# Patient Record
Sex: FEMALE | Race: WHITE | Employment: PART TIME | URBAN - METROPOLITAN AREA
[De-identification: names, ages, dates, MRNs, and addresses within clinical notes are randomized per-mention and may not be internally consistent; named-entity substitution may affect disease eponyms.]

---

## 2017-08-02 ENCOUNTER — APPOINTMENT (OUTPATIENT)
Dept: GENERAL RADIOLOGY | Age: 37
End: 2017-08-02
Payer: MEDICARE

## 2017-08-02 ENCOUNTER — HOSPITAL ENCOUNTER (EMERGENCY)
Age: 37
Discharge: HOME | End: 2017-08-02
Attending: EMERGENCY MEDICINE
Payer: MEDICARE

## 2017-08-02 VITALS
HEIGHT: 62 IN | SYSTOLIC BLOOD PRESSURE: 126 MMHG | HEART RATE: 93 BPM | DIASTOLIC BLOOD PRESSURE: 70 MMHG | BODY MASS INDEX: 20.24 KG/M2 | RESPIRATION RATE: 16 BRPM | OXYGEN SATURATION: 97 % | TEMPERATURE: 97.9 F | WEIGHT: 110 LBS

## 2017-08-02 DIAGNOSIS — S90.32XA CONTUSION OF LEFT FOOT, INITIAL ENCOUNTER: Primary | ICD-10-CM

## 2017-08-02 PROCEDURE — 73630 X-RAY EXAM OF FOOT: CPT

## 2017-08-02 PROCEDURE — 99283 EMERGENCY DEPT VISIT LOW MDM: CPT

## 2017-08-02 ASSESSMENT — ENCOUNTER SYMPTOMS
EYE REDNESS: 0
BACK PAIN: 0
COUGH: 0
VOMITING: 0
NAUSEA: 0
ABDOMINAL PAIN: 0
EYE DISCHARGE: 0
SORE THROAT: 0
SHORTNESS OF BREATH: 0

## 2017-08-02 ASSESSMENT — PAIN DESCRIPTION - LOCATION: LOCATION: FOOT

## 2017-08-02 ASSESSMENT — PAIN DESCRIPTION - PAIN TYPE: TYPE: ACUTE PAIN

## 2017-08-02 ASSESSMENT — PAIN DESCRIPTION - ORIENTATION: ORIENTATION: LEFT

## 2017-08-02 ASSESSMENT — PAIN SCALES - GENERAL: PAINLEVEL_OUTOF10: 7

## 2017-08-29 ENCOUNTER — OFFICE VISIT (OUTPATIENT)
Dept: PODIATRY | Age: 37
End: 2017-08-29
Payer: MEDICARE

## 2017-08-29 VITALS
BODY MASS INDEX: 20.8 KG/M2 | SYSTOLIC BLOOD PRESSURE: 105 MMHG | DIASTOLIC BLOOD PRESSURE: 61 MMHG | HEIGHT: 62 IN | WEIGHT: 113 LBS | HEART RATE: 68 BPM

## 2017-08-29 DIAGNOSIS — M79.672 LEFT FOOT PAIN: ICD-10-CM

## 2017-08-29 DIAGNOSIS — S97.82XA CRUSH INJURY OF FOOT, LEFT, INITIAL ENCOUNTER: ICD-10-CM

## 2017-08-29 DIAGNOSIS — S90.32XA CONTUSION OF LEFT FOOT, INITIAL ENCOUNTER: Primary | ICD-10-CM

## 2017-08-29 DIAGNOSIS — R60.0 EDEMA OF LEFT FOOT: ICD-10-CM

## 2017-08-29 PROCEDURE — 99203 OFFICE O/P NEW LOW 30 MIN: CPT | Performed by: PODIATRIST

## 2017-08-29 PROCEDURE — L4360 PNEUMAT WALKING BOOT PRE CST: HCPCS | Performed by: PODIATRIST

## 2017-08-29 PROCEDURE — 73630 X-RAY EXAM OF FOOT: CPT | Performed by: PODIATRIST

## 2017-08-29 ASSESSMENT — ENCOUNTER SYMPTOMS
SHORTNESS OF BREATH: 0
NAUSEA: 0
BACK PAIN: 0
COLOR CHANGE: 0
DIARRHEA: 0

## 2017-09-26 ENCOUNTER — OFFICE VISIT (OUTPATIENT)
Dept: PODIATRY | Age: 37
End: 2017-09-26
Payer: MEDICARE

## 2017-09-26 VITALS
DIASTOLIC BLOOD PRESSURE: 57 MMHG | SYSTOLIC BLOOD PRESSURE: 102 MMHG | BODY MASS INDEX: 20.8 KG/M2 | HEART RATE: 85 BPM | WEIGHT: 113 LBS | HEIGHT: 62 IN

## 2017-09-26 DIAGNOSIS — S90.32XD CONTUSION OF LEFT FOOT, SUBSEQUENT ENCOUNTER: Primary | ICD-10-CM

## 2017-09-26 DIAGNOSIS — M79.672 LEFT FOOT PAIN: ICD-10-CM

## 2017-09-26 DIAGNOSIS — S97.82XD CRUSH INJURY OF FOOT, LEFT, SUBSEQUENT ENCOUNTER: ICD-10-CM

## 2017-09-26 DIAGNOSIS — S93.601A SPRAIN OF FOOT, RIGHT, INITIAL ENCOUNTER: ICD-10-CM

## 2017-09-26 DIAGNOSIS — M79.671 RIGHT FOOT PAIN: ICD-10-CM

## 2017-09-26 DIAGNOSIS — R60.0 EDEMA OF LEFT FOOT: ICD-10-CM

## 2017-09-26 DIAGNOSIS — R60.0 EDEMA OF RIGHT FOOT: ICD-10-CM

## 2017-09-26 PROCEDURE — 73630 X-RAY EXAM OF FOOT: CPT | Performed by: PODIATRIST

## 2017-09-26 PROCEDURE — 99213 OFFICE O/P EST LOW 20 MIN: CPT | Performed by: PODIATRIST

## 2017-09-26 ASSESSMENT — ENCOUNTER SYMPTOMS
DIARRHEA: 0
BACK PAIN: 0
COLOR CHANGE: 0
SHORTNESS OF BREATH: 0
NAUSEA: 0

## 2018-04-20 ENCOUNTER — HOSPITAL ENCOUNTER (EMERGENCY)
Age: 38
Discharge: HOME | End: 2018-04-20
Attending: EMERGENCY MEDICINE
Payer: MEDICARE

## 2018-04-20 VITALS
RESPIRATION RATE: 16 BRPM | BODY MASS INDEX: 20.24 KG/M2 | WEIGHT: 110 LBS | OXYGEN SATURATION: 100 % | DIASTOLIC BLOOD PRESSURE: 98 MMHG | HEART RATE: 98 BPM | TEMPERATURE: 98.1 F | SYSTOLIC BLOOD PRESSURE: 144 MMHG | HEIGHT: 62 IN

## 2018-04-20 DIAGNOSIS — N76.4 LABIAL ABSCESS: Primary | ICD-10-CM

## 2018-04-20 PROCEDURE — 99282 EMERGENCY DEPT VISIT SF MDM: CPT

## 2018-04-20 PROCEDURE — 10060 I&D ABSCESS SIMPLE/SINGLE: CPT

## 2018-04-20 PROCEDURE — 2500000003 HC RX 250 WO HCPCS: Performed by: PHYSICIAN ASSISTANT

## 2018-04-20 RX ORDER — LIDOCAINE HYDROCHLORIDE 10 MG/ML
5 INJECTION, SOLUTION EPIDURAL; INFILTRATION; INTRACAUDAL; PERINEURAL ONCE
Status: COMPLETED | OUTPATIENT
Start: 2018-04-20 | End: 2018-04-20

## 2018-04-20 RX ORDER — DOXYCYCLINE HYCLATE 100 MG
100 TABLET ORAL 2 TIMES DAILY
Qty: 20 TABLET | Refills: 0 | Status: SHIPPED | OUTPATIENT
Start: 2018-04-20 | End: 2018-04-30

## 2018-04-20 RX ADMIN — LIDOCAINE HYDROCHLORIDE 5 ML: 10 INJECTION, SOLUTION EPIDURAL; INFILTRATION; INTRACAUDAL; PERINEURAL at 17:49

## 2018-04-20 ASSESSMENT — ENCOUNTER SYMPTOMS
COUGH: 0
EYE REDNESS: 0
VOMITING: 0
ABDOMINAL PAIN: 0
SORE THROAT: 0
NAUSEA: 0
BACK PAIN: 0
DIARRHEA: 0
EYE DISCHARGE: 0
SHORTNESS OF BREATH: 0

## 2018-04-20 ASSESSMENT — PAIN DESCRIPTION - ORIENTATION: ORIENTATION: RIGHT

## 2018-04-20 ASSESSMENT — PAIN SCALES - GENERAL: PAINLEVEL_OUTOF10: 7

## 2018-04-20 ASSESSMENT — PAIN DESCRIPTION - LOCATION: LOCATION: VAGINA

## 2018-04-20 ASSESSMENT — PAIN DESCRIPTION - PAIN TYPE: TYPE: ACUTE PAIN

## 2018-08-19 ENCOUNTER — HOSPITAL ENCOUNTER (EMERGENCY)
Age: 38
Discharge: HOME | End: 2018-08-19
Attending: EMERGENCY MEDICINE
Payer: MEDICARE

## 2018-08-19 VITALS
WEIGHT: 115 LBS | RESPIRATION RATE: 18 BRPM | DIASTOLIC BLOOD PRESSURE: 97 MMHG | BODY MASS INDEX: 21.16 KG/M2 | HEART RATE: 95 BPM | HEIGHT: 62 IN | OXYGEN SATURATION: 100 % | TEMPERATURE: 97.5 F | SYSTOLIC BLOOD PRESSURE: 158 MMHG

## 2018-08-19 DIAGNOSIS — N75.0 BARTHOLIN CYST: Primary | ICD-10-CM

## 2018-08-19 PROCEDURE — 6370000000 HC RX 637 (ALT 250 FOR IP): Performed by: EMERGENCY MEDICINE

## 2018-08-19 PROCEDURE — 99282 EMERGENCY DEPT VISIT SF MDM: CPT

## 2018-08-19 RX ORDER — CLINDAMYCIN HYDROCHLORIDE 300 MG/1
300 CAPSULE ORAL 3 TIMES DAILY
Qty: 21 CAPSULE | Refills: 0 | Status: SHIPPED | OUTPATIENT
Start: 2018-08-19 | End: 2018-08-26

## 2018-08-19 RX ORDER — CLINDAMYCIN HYDROCHLORIDE 150 MG/1
300 CAPSULE ORAL ONCE
Status: COMPLETED | OUTPATIENT
Start: 2018-08-19 | End: 2018-08-19

## 2018-08-19 RX ADMIN — CLINDAMYCIN HYDROCHLORIDE 300 MG: 150 CAPSULE ORAL at 21:20

## 2018-08-19 ASSESSMENT — PAIN SCALES - GENERAL
PAINLEVEL_OUTOF10: 5
PAINLEVEL_OUTOF10: 5

## 2018-08-19 ASSESSMENT — ENCOUNTER SYMPTOMS
BACK PAIN: 0
SORE THROAT: 0
ABDOMINAL PAIN: 0
EYE PAIN: 0
VOMITING: 0
NAUSEA: 0
SHORTNESS OF BREATH: 0
COUGH: 0
RHINORRHEA: 0
DIARRHEA: 0

## 2018-08-19 ASSESSMENT — PAIN DESCRIPTION - LOCATION
LOCATION: OTHER (COMMENT)
LOCATION: OTHER (COMMENT)

## 2018-08-20 NOTE — ED PROVIDER NOTES
[facility] ED  [address] [address]  Phone: [phone]  Fax: [phone]    eMERGENCY dEPARTMENT eNCOUnter          Pt Name: Nanci Sesay  MRN: [id number]  [place] 1980  Date of evaluation: 2018      CHIEF COMPLAINT       Chief Complaint   Patient presents with    Abscess     noticed it saturday am. patient reports bartholin cyst and reports pain and swelling to the area. HISTORY OF PRESENT ILLNESS              Nanci Sesay is a 41 y.o. female who presents With concern about her returning bartholin gland Cyst.  Has had this once before and was drained. States it was much worse the first time. Denies any fevers. States she noticed it Saturday starting to come back. Nothing otherwise negative better or worse. Denies any abnormal vaginal bleeding or discharge. No abdominal pain. Denies other symptoms or concerns. Denies any other  symptoms such as dysuria. REVIEW OF SYSTEMS         Review of Systems   Constitutional: Negative for chills, fatigue and fever. HENT: Negative for rhinorrhea and sore throat. Eyes: Negative for pain. Respiratory: Negative for cough and shortness of breath. Cardiovascular: Negative for chest pain. Gastrointestinal: Negative for abdominal pain, diarrhea, nausea and vomiting. Genitourinary: Negative for difficulty urinating, dysuria, vaginal bleeding and vaginal discharge. Musculoskeletal: Negative for back pain and neck pain. Skin: Negative for rash. Neurological: Negative for weakness and headaches. All other systems reviewed and are negative. PAST MEDICAL HISTORY    has a past medical history of Anxiety and Anxiety. SURGICAL HISTORY      has a past surgical history that includes  section and Tubal ligation.     CURRENT MEDICATIONS       Discharge Medication List as of 2018  9:18 PM      CONTINUE these medications which have NOT CHANGED    Details   diazepam (VALIUM) 5 1. Bartholin cyst          DISPOSITION/PLAN   DISPOSITION Decision To Discharge 08/19/2018 09:24:41 PM      Condition on Disposition    Improved    PATIENT REFERRED TO:  Caden Sears  [address], [address]  [facility] [address] [phone]    Schedule an appointment as soon as possible for a visit in 1 week        DISCHARGE MEDICATIONS:  Discharge Medication List as of 8/19/2018  9:18 PM      START taking these medications    Details   clindamycin (CLEOCIN) 300 MG capsule Take 1 capsule by mouth 3 times daily for 7 days, Disp-21 capsule, R-0Print             (Please note that portions of this note were completed with a voice recognition program.  Efforts were made to edit the dictations but occasionally words are mis-transcribed.)    Tej Chapa DO  Attending Emergency Physician           Tej Chapa DO  08/19/18 5043

## 2019-10-04 ENCOUNTER — HOSPITAL ENCOUNTER (EMERGENCY)
Age: 39
Discharge: HOME | End: 2019-10-04
Attending: EMERGENCY MEDICINE
Payer: MEDICARE

## 2019-10-04 VITALS
HEART RATE: 90 BPM | BODY MASS INDEX: 21.03 KG/M2 | OXYGEN SATURATION: 100 % | DIASTOLIC BLOOD PRESSURE: 90 MMHG | RESPIRATION RATE: 15 BRPM | TEMPERATURE: 98.1 F | WEIGHT: 115 LBS | SYSTOLIC BLOOD PRESSURE: 142 MMHG

## 2019-10-04 DIAGNOSIS — N75.0 BARTHOLIN GLAND CYST: Primary | ICD-10-CM

## 2019-10-04 PROCEDURE — 6370000000 HC RX 637 (ALT 250 FOR IP): Performed by: PHYSICIAN ASSISTANT

## 2019-10-04 PROCEDURE — 99283 EMERGENCY DEPT VISIT LOW MDM: CPT

## 2019-10-04 RX ORDER — CHOLECALCIFEROL (VITAMIN D3) 1250 MCG
CAPSULE ORAL
COMMUNITY

## 2019-10-04 RX ORDER — DOXYCYCLINE HYCLATE 100 MG
100 TABLET ORAL 2 TIMES DAILY
Qty: 19 TABLET | Refills: 0 | Status: SHIPPED | OUTPATIENT
Start: 2019-10-04 | End: 2019-10-14

## 2019-10-04 RX ORDER — DOXYCYCLINE HYCLATE 100 MG
100 TABLET ORAL ONCE
Status: COMPLETED | OUTPATIENT
Start: 2019-10-04 | End: 2019-10-04

## 2019-10-04 RX ADMIN — DOXYCYCLINE HYCLATE 100 MG: 100 TABLET, COATED ORAL at 18:32

## 2019-10-04 ASSESSMENT — PAIN DESCRIPTION - PAIN TYPE: TYPE: ACUTE PAIN

## 2019-10-04 ASSESSMENT — ENCOUNTER SYMPTOMS
EYE REDNESS: 0
COUGH: 0
BACK PAIN: 0
SHORTNESS OF BREATH: 0
ABDOMINAL PAIN: 0
NAUSEA: 0
VOMITING: 0
EYE DISCHARGE: 0
SORE THROAT: 0

## 2019-10-04 ASSESSMENT — PAIN SCALES - GENERAL: PAINLEVEL_OUTOF10: 5

## 2019-10-04 ASSESSMENT — PAIN DESCRIPTION - LOCATION: LOCATION: VAGINA

## 2020-05-28 ENCOUNTER — APPOINTMENT (OUTPATIENT)
Dept: GENERAL RADIOLOGY | Age: 40
End: 2020-05-28
Payer: MEDICARE

## 2020-05-28 ENCOUNTER — HOSPITAL ENCOUNTER (EMERGENCY)
Age: 40
Discharge: HOME | End: 2020-05-28
Attending: EMERGENCY MEDICINE
Payer: MEDICARE

## 2020-05-28 VITALS
TEMPERATURE: 99 F | DIASTOLIC BLOOD PRESSURE: 79 MMHG | SYSTOLIC BLOOD PRESSURE: 130 MMHG | OXYGEN SATURATION: 100 % | HEART RATE: 94 BPM | RESPIRATION RATE: 12 BRPM

## 2020-05-28 PROCEDURE — 6370000000 HC RX 637 (ALT 250 FOR IP): Performed by: EMERGENCY MEDICINE

## 2020-05-28 PROCEDURE — 99283 EMERGENCY DEPT VISIT LOW MDM: CPT

## 2020-05-28 PROCEDURE — 73552 X-RAY EXAM OF FEMUR 2/>: CPT

## 2020-05-28 RX ORDER — HYDROCODONE BITARTRATE AND ACETAMINOPHEN 5; 325 MG/1; MG/1
1 TABLET ORAL ONCE
Status: DISCONTINUED | OUTPATIENT
Start: 2020-05-28 | End: 2020-05-28

## 2020-05-28 RX ORDER — CYCLOBENZAPRINE HCL 5 MG
5 TABLET ORAL 3 TIMES DAILY PRN
Qty: 20 TABLET | Refills: 0 | Status: SHIPPED | OUTPATIENT
Start: 2020-05-28 | End: 2020-07-03

## 2020-05-28 RX ORDER — HYDROCODONE BITARTRATE AND ACETAMINOPHEN 5; 325 MG/1; MG/1
1 TABLET ORAL EVERY 6 HOURS PRN
Qty: 10 TABLET | Refills: 0 | Status: SHIPPED | OUTPATIENT
Start: 2020-05-28 | End: 2020-05-31

## 2020-05-28 RX ORDER — IBUPROFEN 600 MG/1
600 TABLET ORAL EVERY 6 HOURS PRN
Qty: 30 TABLET | Refills: 0 | Status: SHIPPED | OUTPATIENT
Start: 2020-05-28 | End: 2020-07-03

## 2020-05-28 RX ORDER — IBUPROFEN 600 MG/1
600 TABLET ORAL ONCE
Status: COMPLETED | OUTPATIENT
Start: 2020-05-28 | End: 2020-05-28

## 2020-05-28 RX ADMIN — IBUPROFEN 600 MG: 600 TABLET, FILM COATED ORAL at 17:52

## 2020-05-28 ASSESSMENT — PAIN DESCRIPTION - ONSET: ONSET: PROGRESSIVE

## 2020-05-28 ASSESSMENT — PAIN DESCRIPTION - DESCRIPTORS: DESCRIPTORS: ACHING;DULL;SHARP;THROBBING

## 2020-05-28 ASSESSMENT — PAIN DESCRIPTION - PROGRESSION: CLINICAL_PROGRESSION: GRADUALLY WORSENING

## 2020-05-28 ASSESSMENT — PAIN DESCRIPTION - PAIN TYPE
TYPE: ACUTE PAIN
TYPE: ACUTE PAIN

## 2020-05-28 ASSESSMENT — PAIN DESCRIPTION - LOCATION: LOCATION: LEG

## 2020-05-28 ASSESSMENT — PAIN SCALES - GENERAL
PAINLEVEL_OUTOF10: 6
PAINLEVEL_OUTOF10: 4
PAINLEVEL_OUTOF10: 6

## 2020-05-28 ASSESSMENT — PAIN DESCRIPTION - FREQUENCY: FREQUENCY: CONTINUOUS

## 2020-05-28 NOTE — ED PROVIDER NOTES
[id number] [facility]  [address] [facility] [address]  Phone: [phone]  Fax: [phone]    Pt Name: Huel Favre  MRN: [id number]  [place] 1980  Date of evaluation: 2020      CHIEF COMPLAINT       Chief Complaint   Patient presents with    Leg Pain     Onset two weeks ago when working out. Not seen at PMD          HISTORY OF PRESENT ILLNESS     Huel Favre is a 47 y.o. female who presents fell 2 weeks ago when she was exercising and  Injured her right posterior thigh. No deformity. She has fairly good range of motion. There is no hip or knee injury. Rest of the right leg exam is uninjured. States her pain is a 6 out of a 10 and she requests pain medication at this time. She took Tylenol without relief of her pain. REVIEW OF SYSTEMS       Other ROS negative except as noted above. PAST MEDICAL HISTORY    has a past medical history of Anxiety and Anxiety. SURGICAL HISTORY      has a past surgical history that includes  section and Tubal ligation. CURRENT MEDICATIONS       Discharge Medication List as of 2020  7:08 PM      CONTINUE these medications which have NOT CHANGED    Details   Cholecalciferol (VITAMIN D3) 44143 units CAPS Take by mouthHistorical Med      diazepam (VALIUM) 5 MG tablet Take 5 mg by mouth every 6 hours as needed for Anxiety      busPIRone (BUSPAR) 15 MG tablet Take 30 mg by mouth 2 times daily             ALLERGIES     has No Known Allergies. FAMILY HISTORY     has no family status information on file. family history is not on file. SOCIAL HISTORY      reports that she has been smoking cigarettes. She has been smoking about 0.50 packs per day. She has never used smokeless tobacco. She reports current alcohol use. She reports that she does not use drugs. PHYSICAL EXAM     INITIAL VITALS:  temperature is 99 °F (37.2 °C). Her blood pressure is 130/79 and her pulse is 94.  Her respiration is 12 and oxygen saturation

## 2020-07-03 ENCOUNTER — HOSPITAL ENCOUNTER (EMERGENCY)
Age: 40
Discharge: HOME | End: 2020-07-03
Attending: EMERGENCY MEDICINE
Payer: MEDICARE

## 2020-07-03 VITALS
TEMPERATURE: 98.8 F | SYSTOLIC BLOOD PRESSURE: 114 MMHG | HEART RATE: 97 BPM | BODY MASS INDEX: 20.38 KG/M2 | OXYGEN SATURATION: 100 % | DIASTOLIC BLOOD PRESSURE: 68 MMHG | HEIGHT: 63 IN | RESPIRATION RATE: 12 BRPM | WEIGHT: 115 LBS

## 2020-07-03 PROCEDURE — 99283 EMERGENCY DEPT VISIT LOW MDM: CPT

## 2020-07-03 PROCEDURE — 6360000002 HC RX W HCPCS: Performed by: EMERGENCY MEDICINE

## 2020-07-03 PROCEDURE — 96372 THER/PROPH/DIAG INJ SC/IM: CPT

## 2020-07-03 RX ORDER — BUTALBITAL, ACETAMINOPHEN, CAFFEINE AND CODEINE PHOSPHATE 300; 50; 40; 30 MG/1; MG/1; MG/1; MG/1
1 CAPSULE ORAL EVERY 4 HOURS
COMMUNITY
Start: 2018-11-14

## 2020-07-03 RX ORDER — KETOROLAC TROMETHAMINE 30 MG/ML
30 INJECTION, SOLUTION INTRAMUSCULAR; INTRAVENOUS ONCE
Status: COMPLETED | OUTPATIENT
Start: 2020-07-03 | End: 2020-07-03

## 2020-07-03 RX ORDER — DEXAMETHASONE SODIUM PHOSPHATE 4 MG/ML
4 INJECTION, SOLUTION INTRA-ARTICULAR; INTRALESIONAL; INTRAMUSCULAR; INTRAVENOUS; SOFT TISSUE ONCE
Status: COMPLETED | OUTPATIENT
Start: 2020-07-03 | End: 2020-07-03

## 2020-07-03 RX ORDER — ONDANSETRON 2 MG/ML
4 INJECTION INTRAMUSCULAR; INTRAVENOUS ONCE
Status: COMPLETED | OUTPATIENT
Start: 2020-07-03 | End: 2020-07-03

## 2020-07-03 RX ADMIN — ONDANSETRON 4 MG: 2 INJECTION INTRAMUSCULAR; INTRAVENOUS at 14:35

## 2020-07-03 RX ADMIN — KETOROLAC TROMETHAMINE 30 MG: 30 INJECTION, SOLUTION INTRAMUSCULAR; INTRAVENOUS at 14:35

## 2020-07-03 RX ADMIN — DEXAMETHASONE SODIUM PHOSPHATE 4 MG: 4 INJECTION, SOLUTION INTRAMUSCULAR; INTRAVENOUS at 14:34

## 2020-07-03 ASSESSMENT — PAIN SCALES - GENERAL
PAINLEVEL_OUTOF10: 6
PAINLEVEL_OUTOF10: 3

## 2020-07-03 ASSESSMENT — PAIN DESCRIPTION - PAIN TYPE: TYPE: ACUTE PAIN

## 2020-07-03 ASSESSMENT — PAIN DESCRIPTION - ORIENTATION: ORIENTATION: POSTERIOR

## 2020-07-03 ASSESSMENT — PAIN DESCRIPTION - DESCRIPTORS: DESCRIPTORS: HEADACHE

## 2020-07-03 ASSESSMENT — PAIN DESCRIPTION - PROGRESSION: CLINICAL_PROGRESSION: RAPIDLY IMPROVING

## 2020-07-03 ASSESSMENT — PAIN DESCRIPTION - LOCATION: LOCATION: HEAD

## 2020-07-03 NOTE — ED PROVIDER NOTES
[address] [facility]  eMERGENCY dEPARTMENT eNCOUnter      Pt Name: Mena Mayer  MRN: [id number]  [place] 1980  Date of evaluation: 7/3/2020      CHIEF COMPLAINT       Chief Complaint   Patient presents with    Migraine         HISTORY OF PRESENT ILLNESS      The patient presents with a migraine headache. It started last night. She took Fioricet last night and it helped her sleep, but now her pain is back. The patient says it's of the back of her head. She's had some photosensitivity and nausea. She reports some visual change, but that has resolved. She denies focal neurologic deficit. Nothing makes her symptoms better or worse otherwise. REVIEW OF SYSTEMS       All systems reviewed and negative unless noted in HPI. The patient denies fever or constitutional symptoms. Blurred vision, which has resolved. Denies any sore throat or rhinorrhea. Denies any neck pain or stiffness. Denies chest pain or shortness of breath. Nausea with headache, without diarrhea. Denies any dysuria. Denies urinary frequency or hematuria. Denies musculoskeletal injury or pain. Denies any weakness, numbness or focal neurologic deficit. Headache as noted in HPI. Denies any skin rash or edema. No recent psychiatric issues. No easy bruising or bleeding. Denies any polyuria, polydypsia or history of immunocompromise. PAST MEDICAL HISTORY    has a past medical history of Anxiety and Anxiety. SURGICAL HISTORY      has a past surgical history that includes  section and Tubal ligation. CURRENT MEDICATIONS       Previous Medications    BUSPIRONE (BUSPAR) 15 MG TABLET    Take 30 mg by mouth 2 times daily    BUTALBITAL-ACETAMINOPHEN-CAFFEINE-CODEINE (FIORICET WITH CODEINE) -50-30 MG PER CAPSULE    Take 1 capsule by mouth every 4 hours.     CHOLECALCIFEROL (VITAMIN D3) 55078 UNITS CAPS    Take by mouth    DIAZEPAM (VALIUM) 5 MG TABLET    Take 5 mg by mouth every 6 hours as needed for Anxiety       ALLERGIES     has No Known Allergies. FAMILY HISTORY     has no family status information on file. family history is not on file. SOCIAL HISTORY      reports that she has been smoking cigarettes. She has been smoking about 0.50 packs per day. She has never used smokeless tobacco. She reports current alcohol use. She reports that she does not use drugs. PHYSICAL EXAM     INITIAL VITALS:  height is 5' 3\" (1.6 m) and weight is 52.2 kg (115 lb). Her oral temperature is 98.8 °F (37.1 °C). Her blood pressure is 114/68 and her pulse is 97. Her respiration is 12 and oxygen saturation is 100%. Patient is alert and oriented, in no apparent distress. HEENT is atraumatic. Pupils are PERRL at 5 mm with normal extraocular motion. Mucous membranes moist.    Neck is supple with no lymphadenopathy. No JVD. No meningismus. Heart sounds regular rate and rhythm with no gallops, murmurs, or rubs. Lungs clear, no wheezes, rales or rhonchi. Abdomen: soft, nontender with no pain to palpation. Musculoskeletal exam shows no evidence of trauma. Normal distal pulses in all extremities. Skin: no rash or edema. Neurological exam reveals cranial nerves 2 through 12 grossly intact. Patient has equal  and normal deep tendon reflexes. Psychiatric: appropriate. Lymphatics.:  No lymphadenopathy. DIFFERENTIAL DIAGNOSIS/ MDM:     Cephalgia, SAH, meningitis    DIAGNOSTIC RESULTS       EMERGENCY DEPARTMENT COURSE:   Vitals:    Vitals:    07/03/20 1417 07/03/20 1522   BP: (!) 153/105 114/68   Pulse: 109 97   Resp: 12    Temp: 98.8 °F (37.1 °C)    TempSrc: Oral    SpO2: 100%    Weight: 52.2 kg (115 lb)    Height: 5' 3\" (1.6 m)      -------------------------  BP: 114/68, Temp: 98.8 °F (37.1 °C), Pulse: 97, Resp: 12      Re-evaluation Notes    The patient reports improvement with IM Toradol, Decadron, and Zofran. She can rest at home.   My index of suspicion for hemorrhage or infection is low. She is discharged in good condition. FINAL IMPRESSION      1.  Nonintractable episodic headache, unspecified headache type          DISPOSITION/PLAN   DISPOSITION Decision To Discharge 07/03/2020 03:27:58 PM      Condition on Disposition    good    PATIENT REFERRED TO:  Travis Peterson  [address] [place] [address], [address]  [place] [id number] [phone]    In 3 days        DISCHARGE MEDICATIONS:  New Prescriptions    No medications on file       (Please note that portions of this note were completed with a voice recognition program.  Efforts were made to edit the dictations but occasionally words are mis-transcribed.)    García MD   Attending Emergency Physician         Salma Garcia MD  07/03/20 1118

## 2020-07-03 NOTE — LETTER
[id number] [facility] ED  [address] [facility] [address]  Phone: [phone]  Fax: [phone]               July 3, 2020    Patient: Paola James   YOB: 1980   Date of Visit: 7/3/2020       To Whom It May Concern:    Paola Jaems was seen and treated in our emergency department on 7/3/2020. She may return to work on 07/05/2020.       Sincerely,       Martín Kidd MD         Signature:__________________________________

## 2020-07-03 NOTE — ED NOTES
Pt to exam room 7 with c/o posterior migraine. She denies vision changes or extremity weakness. She has prescribed Fioricet by she states she is not achieving relief.      Bud Medina RN  07/03/20 3780

## 2021-02-08 ENCOUNTER — HOSPITAL ENCOUNTER (EMERGENCY)
Age: 41
Discharge: HOME | End: 2021-02-08
Attending: EMERGENCY MEDICINE
Payer: MEDICARE

## 2021-02-08 VITALS
BODY MASS INDEX: 19.49 KG/M2 | SYSTOLIC BLOOD PRESSURE: 140 MMHG | DIASTOLIC BLOOD PRESSURE: 90 MMHG | HEIGHT: 63 IN | OXYGEN SATURATION: 100 % | TEMPERATURE: 98.6 F | WEIGHT: 110 LBS | RESPIRATION RATE: 16 BRPM | HEART RATE: 95 BPM

## 2021-02-08 DIAGNOSIS — G43.809 OTHER MIGRAINE WITHOUT STATUS MIGRAINOSUS, NOT INTRACTABLE: Primary | ICD-10-CM

## 2021-02-08 PROCEDURE — 6360000002 HC RX W HCPCS: Performed by: EMERGENCY MEDICINE

## 2021-02-08 PROCEDURE — 96372 THER/PROPH/DIAG INJ SC/IM: CPT

## 2021-02-08 PROCEDURE — 99283 EMERGENCY DEPT VISIT LOW MDM: CPT

## 2021-02-08 RX ORDER — DEXAMETHASONE SODIUM PHOSPHATE 10 MG/ML
10 INJECTION INTRAMUSCULAR; INTRAVENOUS ONCE
Status: COMPLETED | OUTPATIENT
Start: 2021-02-08 | End: 2021-02-08

## 2021-02-08 RX ORDER — KETOROLAC TROMETHAMINE 30 MG/ML
30 INJECTION, SOLUTION INTRAMUSCULAR; INTRAVENOUS ONCE
Status: COMPLETED | OUTPATIENT
Start: 2021-02-08 | End: 2021-02-08

## 2021-02-08 RX ORDER — ONDANSETRON 2 MG/ML
4 INJECTION INTRAMUSCULAR; INTRAVENOUS ONCE
Status: COMPLETED | OUTPATIENT
Start: 2021-02-08 | End: 2021-02-08

## 2021-02-08 RX ADMIN — KETOROLAC TROMETHAMINE 30 MG: 30 INJECTION, SOLUTION INTRAMUSCULAR; INTRAVENOUS at 08:56

## 2021-02-08 RX ADMIN — ONDANSETRON 4 MG: 2 INJECTION INTRAMUSCULAR; INTRAVENOUS at 08:56

## 2021-02-08 RX ADMIN — DEXAMETHASONE SODIUM PHOSPHATE 10 MG: 10 INJECTION INTRAMUSCULAR; INTRAVENOUS at 08:56

## 2021-02-08 ASSESSMENT — PAIN SCALES - GENERAL
PAINLEVEL_OUTOF10: 6
PAINLEVEL_OUTOF10: 6

## 2021-02-08 NOTE — ED PROVIDER NOTES
[address] [facility] ED  [address] [facility] [address]  Phone: [phone]  Fax: Norma Garcia [phone]      Pt Name: Haley Waters  MRN: [id number]  [place] 1980  Date of evaluation: 2021    CHIEF COMPLAINT       Chief Complaint   Patient presents with    Migraine       HISTORY OF PRESENT ILLNESS    Haley Waters is a 47 y.o. female who presents to the emergency department complaining of a migraine headache that started yesterday around 6:00 after she woke up from a nap in the evening. Pain 6 out of 10 admits to photophobia nausea no vomiting. History of migraines but significantly diminished since she has had Botox injections. Last time she had a significant headache she was here sometime last year. Denies any fevers or chills no neck pain. No other symptoms. Nothing really makes it better light makes it worse. Typical migraine for her. No thunderclap not the worst headache of her life. REVIEW OF SYSTEMS       Constitutional: No fevers or chills   HEENT: No sore throat, rhinorrhea, or earache   Eyes: No blurry vision or double vision no drainage positive photophobia  Cardiovascular: No chest pain or tachycardia   Respiratory: No wheezing or shortness of breath no cough   Gastrointestinal: Positive nausea, no vomiting, diarrhea, constipation, or abdominal pain   : No hematuria or dysuria   Musculoskeletal: No swelling or pain   Skin: No rash   Neurological: No focal neurologic complaints, paresthesias, weakness, positive headache     PAST MEDICAL HISTORY    has a past medical history of Anxiety and Anxiety. SURGICAL HISTORY      has a past surgical history that includes  section and Tubal ligation.     CURRENT MEDICATIONS       Previous Medications    BUSPIRONE (BUSPAR) 15 MG TABLET    Take 30 mg by mouth 2 times daily    BUTALBITAL-ACETAMINOPHEN-CAFFEINE-CODEINE (FIORICET WITH CODEINE) -15-30 MG PER CAPSULE    Take 1 capsule by mouth every 4 hours. CHOLECALCIFEROL (VITAMIN D3) 99396 UNITS CAPS    Take by mouth    DIAZEPAM (VALIUM) 5 MG TABLET    Take 5 mg by mouth every 6 hours as needed for Anxiety       ALLERGIES     has No Known Allergies. FAMILY HISTORY     has no family status information on file. family history is not on file. SOCIAL HISTORY      reports that she has been smoking cigarettes. She has been smoking about 0.50 packs per day. She has never used smokeless tobacco. She reports current alcohol use. She reports that she does not use drugs. PHYSICAL EXAM       ED Triage Vitals [02/08/21 0846]   BP Temp Temp src Pulse Resp SpO2 Height Weight   (!) 140/90 98.6 °F (37 °C) -- 95 16 100 % 5' 3\" (1.6 m) 110 lb (49.9 kg)       Constitutional: Alert, oriented x3, nontoxic, answering questions appropriately, acting properly for age, in no acute distress   HEENT: Extraocular muscles intact, positive photophobia  Neck: Trachea midline   Cardiovascular: Regular rhythm and rate no S3, S4, or murmurs   Respiratory: Clear to auscultation bilaterally no wheezes, rhonchi, rales, no respiratory distress no tachypnea no retractions no hypoxia  Gastrointestinal: Soft, nontender, nondistended, positive bowel sounds. No rebound, rigidity, or guarding. Musculoskeletal: No extremity pain or swelling   Neurologic: Moving all 4 extremities without difficulty there are no gross focal neurologic deficits   Skin: Warm and dry       DIFFERENTIAL DIAGNOSIS/ MDM:     No gross focal neurologic deficits. Typical migraine behind her eyes. Will give Toradol Zofran and Decadron which is worked for her in the past.  Low suspicion for subarachnoid hemorrhage or meningitis. No fevers.     DIAGNOSTIC RESULTS     EKG: All EKG's are interpreted by the Emergency Department Physician who either signs or Co-signs this chart in the absence of a cardiologist.        Not indicated unless otherwise documented above    LABS:  No results found for this visit on 02/08/21. Not indicated unless otherwise documented above    RADIOLOGY:   I reviewed the radiologist interpretations:    No orders to display       Not indicated unless otherwise documented above    EMERGENCY DEPARTMENT COURSE:     The patient was given the following medications:  Orders Placed This Encounter   Medications    ketorolac (TORADOL) injection 30 mg    ondansetron (ZOFRAN) injection 4 mg    dexamethasone (DECADRON) injection 10 mg        Vitals:   -------------------------  BP (!) 140/90   Pulse 95   Temp 98.6 °F (37 °C)   Resp 16   Ht 5' 3\" (1.6 m)   Wt 49.9 kg (110 lb)   SpO2 100%   BMI 19.49 kg/m²     9:30 AM feeling much better pain oh 2 out of 10. Typical migraine not the worst headache of her life low suspicion for subarachnoid hemorrhage or meningitis. Nontoxic in no acute distress will discharge to follow-up and return if worsening symptoms or other concerns. The patient understands that at this time there is no evidence for a more malignant underlying process, but they also understand that early in the process of an illness or injury, an emergency department workup can be falsely reassuring. Routine discharge counseling was given, and they understand that worsening, changing or persistent symptoms should prompt an immediate call or follow up with their primary physician or return to the emergency department. The importance of appropriate follow up was also discussed. I have reviewed the disposition diagnosis with them. I have answered their questions and given discharge instructions. They voiced understanding of these instructions and did not have any further questions or complaints. CRITICAL CARE:    None    CONSULTS:    None    PROCEDURES:    None      OARRS Report if indicated             FINAL IMPRESSION      1.  Other migraine without status migrainosus, not intractable          DISPOSITION/PLAN   DISPOSITION Decision To Discharge 02/08/2021 09:30:41 AM        CONDITION ON DISPOSITION: STABLE       PATIENT REFERRED TO:  Dago Mosqueda  [address]  [facility] [address]  [phone]    Schedule an appointment as soon as possible for a visit in 2 days        DISCHARGE MEDICATIONS:  New Prescriptions    No medications on file       (Please note that portions of thisnote were completed with a voice recognition program.  Efforts were made to edit the dictations but occasionally words are mis-transcribed.)    Lane DO  Attending Emergency Physician     Joseph Betancourt DO  02/08/21 2085

## 2021-12-13 ENCOUNTER — ANESTHESIA (OUTPATIENT)
Dept: OPERATING ROOM | Age: 41
End: 2021-12-13
Payer: MEDICARE

## 2021-12-13 ENCOUNTER — HOSPITAL ENCOUNTER (EMERGENCY)
Age: 41
Discharge: HOME | End: 2021-12-13
Attending: EMERGENCY MEDICINE
Payer: MEDICARE

## 2021-12-13 ENCOUNTER — ANESTHESIA EVENT (OUTPATIENT)
Dept: OPERATING ROOM | Age: 41
End: 2021-12-13
Payer: MEDICARE

## 2021-12-13 ENCOUNTER — APPOINTMENT (OUTPATIENT)
Dept: CT IMAGING | Age: 41
End: 2021-12-13
Payer: MEDICARE

## 2021-12-13 VITALS
WEIGHT: 131.25 LBS | OXYGEN SATURATION: 100 % | DIASTOLIC BLOOD PRESSURE: 100 MMHG | BODY MASS INDEX: 24.15 KG/M2 | HEIGHT: 62 IN | TEMPERATURE: 97.3 F | RESPIRATION RATE: 16 BRPM | SYSTOLIC BLOOD PRESSURE: 157 MMHG | HEART RATE: 92 BPM

## 2021-12-13 VITALS — OXYGEN SATURATION: 100 % | TEMPERATURE: 96.6 F | DIASTOLIC BLOOD PRESSURE: 108 MMHG | SYSTOLIC BLOOD PRESSURE: 160 MMHG

## 2021-12-13 DIAGNOSIS — R11.2 NON-INTRACTABLE VOMITING WITH NAUSEA, UNSPECIFIED VOMITING TYPE: ICD-10-CM

## 2021-12-13 DIAGNOSIS — N94.9 ADNEXAL CYST: ICD-10-CM

## 2021-12-13 DIAGNOSIS — K80.50 BILIARY COLIC: Primary | ICD-10-CM

## 2021-12-13 DIAGNOSIS — R10.11 ABDOMINAL PAIN, RIGHT UPPER QUADRANT: ICD-10-CM

## 2021-12-13 DIAGNOSIS — E87.6 HYPOKALEMIA: ICD-10-CM

## 2021-12-13 LAB
ABSOLUTE EOS #: 0.29 K/UL (ref 0–0.4)
ABSOLUTE IMMATURE GRANULOCYTE: ABNORMAL K/UL (ref 0–0.3)
ABSOLUTE LYMPH #: 2.98 K/UL (ref 1–4.8)
ABSOLUTE MONO #: 1.34 K/UL (ref 0.1–0.8)
ALBUMIN SERPL-MCNC: 4.6 G/DL (ref 3.5–5.2)
ALBUMIN/GLOBULIN RATIO: 1.7 (ref 1–2.5)
ALP BLD-CCNC: 71 U/L (ref 35–104)
ALT SERPL-CCNC: 50 U/L (ref 5–33)
ANION GAP SERPL CALCULATED.3IONS-SCNC: 16 MMOL/L (ref 9–17)
AST SERPL-CCNC: 44 U/L
BASOPHILS # BLD: 0 % (ref 0–2)
BASOPHILS ABSOLUTE: 0 K/UL (ref 0–0.2)
BILIRUB SERPL-MCNC: 0.5 MG/DL (ref 0.3–1.2)
BILIRUBIN DIRECT: 0.12 MG/DL
BILIRUBIN URINE: NEGATIVE
BILIRUBIN, INDIRECT: 0.38 MG/DL (ref 0–1)
BUN BLDV-MCNC: 9 MG/DL (ref 6–20)
BUN/CREAT BLD: ABNORMAL (ref 9–20)
CALCIUM SERPL-MCNC: 9.1 MG/DL (ref 8.6–10.4)
CHLORIDE BLD-SCNC: 104 MMOL/L (ref 98–107)
CO2: 20 MMOL/L (ref 20–31)
COLOR: YELLOW
COMMENT UA: NORMAL
CREAT SERPL-MCNC: 0.5 MG/DL (ref 0.5–0.9)
DIFFERENTIAL TYPE: ABNORMAL
EOSINOPHILS RELATIVE PERCENT: 3 % (ref 1–4)
GFR AFRICAN AMERICAN: >60 ML/MIN
GFR NON-AFRICAN AMERICAN: >60 ML/MIN
GFR SERPL CREATININE-BSD FRML MDRD: ABNORMAL ML/MIN/{1.73_M2}
GFR SERPL CREATININE-BSD FRML MDRD: ABNORMAL ML/MIN/{1.73_M2}
GLOBULIN: ABNORMAL G/DL (ref 1.5–3.8)
GLUCOSE BLD-MCNC: 114 MG/DL (ref 70–99)
GLUCOSE URINE: NEGATIVE
HCG QUALITATIVE: NEGATIVE
HCT VFR BLD CALC: 38 % (ref 36–46)
HEMOGLOBIN: 12.7 G/DL (ref 12–16)
IMMATURE GRANULOCYTES: ABNORMAL %
KETONES, URINE: NEGATIVE
LEUKOCYTE ESTERASE, URINE: NEGATIVE
LIPASE: 20 U/L (ref 13–60)
LYMPHOCYTES # BLD: 31 % (ref 24–44)
MAGNESIUM: 2.4 MG/DL (ref 1.6–2.6)
MCH RBC QN AUTO: 32.6 PG (ref 26–34)
MCHC RBC AUTO-ENTMCNC: 33.4 G/DL (ref 31–37)
MCV RBC AUTO: 97.7 FL (ref 80–100)
MONOCYTES # BLD: 14 % (ref 1–7)
MORPHOLOGY: NORMAL
NITRITE, URINE: NEGATIVE
NRBC AUTOMATED: ABNORMAL PER 100 WBC
PDW BLD-RTO: 13.5 % (ref 12.5–15.4)
PH UA: 5.5 (ref 5–8)
PLATELET # BLD: 278 K/UL (ref 140–450)
PLATELET ESTIMATE: ABNORMAL
PMV BLD AUTO: 10 FL (ref 8–14)
POTASSIUM SERPL-SCNC: 2.6 MMOL/L (ref 3.7–5.3)
POTASSIUM SERPL-SCNC: 3.9 MMOL/L (ref 3.7–5.3)
PROTEIN UA: NEGATIVE
RBC # BLD: 3.89 M/UL (ref 4–5.2)
RBC # BLD: ABNORMAL 10*6/UL
SARS-COV-2, RAPID: NOT DETECTED
SEG NEUTROPHILS: 52 % (ref 36–66)
SEGMENTED NEUTROPHILS ABSOLUTE COUNT: 4.99 K/UL (ref 1.8–7.7)
SODIUM BLD-SCNC: 140 MMOL/L (ref 135–144)
SPECIFIC GRAVITY UA: 1.01 (ref 1–1.03)
SPECIMEN DESCRIPTION: NORMAL
TOTAL PROTEIN: 7.3 G/DL (ref 6.4–8.3)
TURBIDITY: CLEAR
URINE HGB: NEGATIVE
UROBILINOGEN, URINE: NORMAL
WBC # BLD: 9.6 K/UL (ref 3.5–11)
WBC # BLD: ABNORMAL 10*3/UL

## 2021-12-13 PROCEDURE — S2900 ROBOTIC SURGICAL SYSTEM: HCPCS | Performed by: SURGERY

## 2021-12-13 PROCEDURE — C9290 INJ, BUPIVACAINE LIPOSOME: HCPCS | Performed by: ANESTHESIOLOGY

## 2021-12-13 PROCEDURE — 3700000001 HC ADD 15 MINUTES (ANESTHESIA): Performed by: SURGERY

## 2021-12-13 PROCEDURE — 84703 CHORIONIC GONADOTROPIN ASSAY: CPT

## 2021-12-13 PROCEDURE — 6360000002 HC RX W HCPCS

## 2021-12-13 PROCEDURE — 64488 TAP BLOCK BI INJECTION: CPT | Performed by: ANESTHESIOLOGY

## 2021-12-13 PROCEDURE — 84132 ASSAY OF SERUM POTASSIUM: CPT

## 2021-12-13 PROCEDURE — 96375 TX/PRO/DX INJ NEW DRUG ADDON: CPT

## 2021-12-13 PROCEDURE — 3700000000 HC ANESTHESIA ATTENDED CARE: Performed by: SURGERY

## 2021-12-13 PROCEDURE — C1760 CLOSURE DEV, VASC: HCPCS | Performed by: SURGERY

## 2021-12-13 PROCEDURE — 81003 URINALYSIS AUTO W/O SCOPE: CPT

## 2021-12-13 PROCEDURE — 6360000002 HC RX W HCPCS: Performed by: ANESTHESIOLOGY

## 2021-12-13 PROCEDURE — 87635 SARS-COV-2 COVID-19 AMP PRB: CPT

## 2021-12-13 PROCEDURE — 7100000000 HC PACU RECOVERY - FIRST 15 MIN: Performed by: SURGERY

## 2021-12-13 PROCEDURE — 85025 COMPLETE CBC W/AUTO DIFF WBC: CPT

## 2021-12-13 PROCEDURE — 7100000001 HC PACU RECOVERY - ADDTL 15 MIN: Performed by: SURGERY

## 2021-12-13 PROCEDURE — 6360000004 HC RX CONTRAST MEDICATION: Performed by: EMERGENCY MEDICINE

## 2021-12-13 PROCEDURE — 36415 COLL VENOUS BLD VENIPUNCTURE: CPT

## 2021-12-13 PROCEDURE — 83690 ASSAY OF LIPASE: CPT

## 2021-12-13 PROCEDURE — 80076 HEPATIC FUNCTION PANEL: CPT

## 2021-12-13 PROCEDURE — 83735 ASSAY OF MAGNESIUM: CPT

## 2021-12-13 PROCEDURE — 6360000002 HC RX W HCPCS: Performed by: EMERGENCY MEDICINE

## 2021-12-13 PROCEDURE — 2500000003 HC RX 250 WO HCPCS: Performed by: NURSE ANESTHETIST, CERTIFIED REGISTERED

## 2021-12-13 PROCEDURE — 3600000009 HC SURGERY ROBOT BASE: Performed by: SURGERY

## 2021-12-13 PROCEDURE — 6370000000 HC RX 637 (ALT 250 FOR IP): Performed by: ANESTHESIOLOGY

## 2021-12-13 PROCEDURE — 80048 BASIC METABOLIC PNL TOTAL CA: CPT

## 2021-12-13 PROCEDURE — 2709999900 HC NON-CHARGEABLE SUPPLY: Performed by: SURGERY

## 2021-12-13 PROCEDURE — 2580000003 HC RX 258: Performed by: EMERGENCY MEDICINE

## 2021-12-13 PROCEDURE — 96376 TX/PRO/DX INJ SAME DRUG ADON: CPT

## 2021-12-13 PROCEDURE — 2500000003 HC RX 250 WO HCPCS: Performed by: ANESTHESIOLOGY

## 2021-12-13 PROCEDURE — 3600000019 HC SURGERY ROBOT ADDTL 15MIN: Performed by: SURGERY

## 2021-12-13 PROCEDURE — 96366 THER/PROPH/DIAG IV INF ADDON: CPT

## 2021-12-13 PROCEDURE — 74177 CT ABD & PELVIS W/CONTRAST: CPT

## 2021-12-13 PROCEDURE — 6370000000 HC RX 637 (ALT 250 FOR IP): Performed by: EMERGENCY MEDICINE

## 2021-12-13 PROCEDURE — 6360000002 HC RX W HCPCS: Performed by: NURSE ANESTHETIST, CERTIFIED REGISTERED

## 2021-12-13 PROCEDURE — 7100000010 HC PHASE II RECOVERY - FIRST 15 MIN: Performed by: SURGERY

## 2021-12-13 PROCEDURE — 96365 THER/PROPH/DIAG IV INF INIT: CPT

## 2021-12-13 PROCEDURE — 99285 EMERGENCY DEPT VISIT HI MDM: CPT

## 2021-12-13 PROCEDURE — 96368 THER/DIAG CONCURRENT INF: CPT

## 2021-12-13 PROCEDURE — 2580000003 HC RX 258: Performed by: ANESTHESIOLOGY

## 2021-12-13 PROCEDURE — 7100000011 HC PHASE II RECOVERY - ADDTL 15 MIN: Performed by: SURGERY

## 2021-12-13 PROCEDURE — 88304 TISSUE EXAM BY PATHOLOGIST: CPT

## 2021-12-13 RX ORDER — LIDOCAINE HYDROCHLORIDE 10 MG/ML
INJECTION, SOLUTION EPIDURAL; INFILTRATION; INTRACAUDAL; PERINEURAL PRN
Status: DISCONTINUED | OUTPATIENT
Start: 2021-12-13 | End: 2021-12-13 | Stop reason: SDUPTHER

## 2021-12-13 RX ORDER — FENTANYL CITRATE 50 UG/ML
INJECTION, SOLUTION INTRAMUSCULAR; INTRAVENOUS PRN
Status: DISCONTINUED | OUTPATIENT
Start: 2021-12-13 | End: 2021-12-13 | Stop reason: SDUPTHER

## 2021-12-13 RX ORDER — ONDANSETRON 2 MG/ML
4 INJECTION INTRAMUSCULAR; INTRAVENOUS ONCE
Status: COMPLETED | OUTPATIENT
Start: 2021-12-13 | End: 2021-12-13

## 2021-12-13 RX ORDER — HYDRALAZINE HYDROCHLORIDE 20 MG/ML
10 INJECTION INTRAMUSCULAR; INTRAVENOUS EVERY 10 MIN PRN
Status: DISCONTINUED | OUTPATIENT
Start: 2021-12-13 | End: 2021-12-13 | Stop reason: HOSPADM

## 2021-12-13 RX ORDER — INDOCYANINE GREEN AND WATER 25 MG
KIT INJECTION
Status: COMPLETED
Start: 2021-12-13 | End: 2021-12-13

## 2021-12-13 RX ORDER — HYDROCODONE BITARTRATE AND ACETAMINOPHEN 5; 325 MG/1; MG/1
1 TABLET ORAL EVERY 6 HOURS PRN
Qty: 21 TABLET | Refills: 0 | Status: SHIPPED | OUTPATIENT
Start: 2021-12-13 | End: 2021-12-18

## 2021-12-13 RX ORDER — MORPHINE SULFATE 2 MG/ML
2 INJECTION, SOLUTION INTRAMUSCULAR; INTRAVENOUS EVERY 5 MIN PRN
Status: DISCONTINUED | OUTPATIENT
Start: 2021-12-13 | End: 2021-12-13 | Stop reason: HOSPADM

## 2021-12-13 RX ORDER — SODIUM CHLORIDE 0.9 % (FLUSH) 0.9 %
10 SYRINGE (ML) INJECTION PRN
Status: DISCONTINUED | OUTPATIENT
Start: 2021-12-13 | End: 2021-12-13 | Stop reason: HOSPADM

## 2021-12-13 RX ORDER — KETOROLAC TROMETHAMINE 30 MG/ML
INJECTION, SOLUTION INTRAMUSCULAR; INTRAVENOUS PRN
Status: DISCONTINUED | OUTPATIENT
Start: 2021-12-13 | End: 2021-12-13 | Stop reason: SDUPTHER

## 2021-12-13 RX ORDER — ONDANSETRON 4 MG/1
4 TABLET, FILM COATED ORAL EVERY 8 HOURS PRN
Qty: 21 TABLET | Refills: 0 | Status: SHIPPED | OUTPATIENT
Start: 2021-12-13 | End: 2021-12-20

## 2021-12-13 RX ORDER — INDOCYANINE GREEN AND WATER 25 MG
KIT INJECTION PRN
Status: DISCONTINUED | OUTPATIENT
Start: 2021-12-13 | End: 2021-12-13 | Stop reason: SDUPTHER

## 2021-12-13 RX ORDER — MIDAZOLAM HYDROCHLORIDE 2 MG/2ML
2 INJECTION, SOLUTION INTRAMUSCULAR; INTRAVENOUS ONCE
Status: COMPLETED | OUTPATIENT
Start: 2021-12-13 | End: 2021-12-13

## 2021-12-13 RX ORDER — FENTANYL CITRATE 50 UG/ML
100 INJECTION, SOLUTION INTRAMUSCULAR; INTRAVENOUS ONCE
Status: COMPLETED | OUTPATIENT
Start: 2021-12-13 | End: 2021-12-13

## 2021-12-13 RX ORDER — LIDOCAINE HYDROCHLORIDE 10 MG/ML
1 INJECTION, SOLUTION EPIDURAL; INFILTRATION; INTRACAUDAL; PERINEURAL
Status: DISCONTINUED | OUTPATIENT
Start: 2021-12-13 | End: 2021-12-13 | Stop reason: HOSPADM

## 2021-12-13 RX ORDER — CEFAZOLIN SODIUM 2 G/50ML
SOLUTION INTRAVENOUS PRN
Status: DISCONTINUED | OUTPATIENT
Start: 2021-12-13 | End: 2021-12-13 | Stop reason: SDUPTHER

## 2021-12-13 RX ORDER — SODIUM CHLORIDE 9 MG/ML
INJECTION, SOLUTION INTRAVENOUS CONTINUOUS
Status: DISCONTINUED | OUTPATIENT
Start: 2021-12-13 | End: 2021-12-13 | Stop reason: HOSPADM

## 2021-12-13 RX ORDER — MIDAZOLAM HYDROCHLORIDE 2 MG/2ML
1 INJECTION, SOLUTION INTRAMUSCULAR; INTRAVENOUS ONCE
Status: DISCONTINUED | OUTPATIENT
Start: 2021-12-13 | End: 2021-12-13 | Stop reason: HOSPADM

## 2021-12-13 RX ORDER — GLYCOPYRROLATE 1 MG/5 ML
SYRINGE (ML) INTRAVENOUS PRN
Status: DISCONTINUED | OUTPATIENT
Start: 2021-12-13 | End: 2021-12-13 | Stop reason: SDUPTHER

## 2021-12-13 RX ORDER — ONDANSETRON 2 MG/ML
4 INJECTION INTRAMUSCULAR; INTRAVENOUS
Status: DISCONTINUED | OUTPATIENT
Start: 2021-12-13 | End: 2021-12-13 | Stop reason: HOSPADM

## 2021-12-13 RX ORDER — SODIUM CHLORIDE 0.9 % (FLUSH) 0.9 %
10 SYRINGE (ML) INJECTION EVERY 12 HOURS SCHEDULED
Status: DISCONTINUED | OUTPATIENT
Start: 2021-12-13 | End: 2021-12-13 | Stop reason: HOSPADM

## 2021-12-13 RX ORDER — BUPIVACAINE HYDROCHLORIDE 2.5 MG/ML
INJECTION, SOLUTION EPIDURAL; INFILTRATION; INTRACAUDAL
Status: COMPLETED | OUTPATIENT
Start: 2021-12-13 | End: 2021-12-13

## 2021-12-13 RX ORDER — LABETALOL 20 MG/4 ML (5 MG/ML) INTRAVENOUS SYRINGE
10 EVERY 10 MIN PRN
Status: DISCONTINUED | OUTPATIENT
Start: 2021-12-13 | End: 2021-12-13 | Stop reason: HOSPADM

## 2021-12-13 RX ORDER — NEOSTIGMINE METHYLSULFATE 5 MG/5 ML
SYRINGE (ML) INTRAVENOUS PRN
Status: DISCONTINUED | OUTPATIENT
Start: 2021-12-13 | End: 2021-12-13 | Stop reason: SDUPTHER

## 2021-12-13 RX ORDER — MIDAZOLAM HYDROCHLORIDE 1 MG/ML
INJECTION INTRAMUSCULAR; INTRAVENOUS
Status: COMPLETED
Start: 2021-12-13 | End: 2021-12-13

## 2021-12-13 RX ORDER — ONDANSETRON 2 MG/ML
INJECTION INTRAMUSCULAR; INTRAVENOUS PRN
Status: DISCONTINUED | OUTPATIENT
Start: 2021-12-13 | End: 2021-12-13 | Stop reason: SDUPTHER

## 2021-12-13 RX ORDER — PROMETHAZINE HYDROCHLORIDE 25 MG/ML
6.25 INJECTION, SOLUTION INTRAMUSCULAR; INTRAVENOUS
Status: DISCONTINUED | OUTPATIENT
Start: 2021-12-13 | End: 2021-12-13 | Stop reason: HOSPADM

## 2021-12-13 RX ORDER — FENTANYL CITRATE 50 UG/ML
INJECTION, SOLUTION INTRAMUSCULAR; INTRAVENOUS PRN
Status: DISCONTINUED | OUTPATIENT
Start: 2021-12-13 | End: 2021-12-13

## 2021-12-13 RX ORDER — SODIUM CHLORIDE, SODIUM LACTATE, POTASSIUM CHLORIDE, CALCIUM CHLORIDE 600; 310; 30; 20 MG/100ML; MG/100ML; MG/100ML; MG/100ML
INJECTION, SOLUTION INTRAVENOUS CONTINUOUS
Status: DISCONTINUED | OUTPATIENT
Start: 2021-12-13 | End: 2021-12-13 | Stop reason: HOSPADM

## 2021-12-13 RX ORDER — FENTANYL CITRATE 50 UG/ML
INJECTION, SOLUTION INTRAMUSCULAR; INTRAVENOUS
Status: COMPLETED
Start: 2021-12-13 | End: 2021-12-13

## 2021-12-13 RX ORDER — POTASSIUM CHLORIDE 7.45 MG/ML
10 INJECTION INTRAVENOUS ONCE
Status: COMPLETED | OUTPATIENT
Start: 2021-12-13 | End: 2021-12-13

## 2021-12-13 RX ORDER — MIDAZOLAM HYDROCHLORIDE 1 MG/ML
INJECTION INTRAMUSCULAR; INTRAVENOUS PRN
Status: DISCONTINUED | OUTPATIENT
Start: 2021-12-13 | End: 2021-12-13

## 2021-12-13 RX ORDER — ROCURONIUM BROMIDE 10 MG/ML
INJECTION, SOLUTION INTRAVENOUS PRN
Status: DISCONTINUED | OUTPATIENT
Start: 2021-12-13 | End: 2021-12-13 | Stop reason: SDUPTHER

## 2021-12-13 RX ORDER — SODIUM CHLORIDE 9 MG/ML
25 INJECTION, SOLUTION INTRAVENOUS PRN
Status: DISCONTINUED | OUTPATIENT
Start: 2021-12-13 | End: 2021-12-13 | Stop reason: HOSPADM

## 2021-12-13 RX ORDER — MEPERIDINE HYDROCHLORIDE 50 MG/ML
12.5 INJECTION INTRAMUSCULAR; INTRAVENOUS; SUBCUTANEOUS EVERY 5 MIN PRN
Status: DISCONTINUED | OUTPATIENT
Start: 2021-12-13 | End: 2021-12-13 | Stop reason: HOSPADM

## 2021-12-13 RX ORDER — MORPHINE SULFATE 4 MG/ML
4 INJECTION, SOLUTION INTRAMUSCULAR; INTRAVENOUS ONCE
Status: COMPLETED | OUTPATIENT
Start: 2021-12-13 | End: 2021-12-13

## 2021-12-13 RX ORDER — 0.9 % SODIUM CHLORIDE 0.9 %
1000 INTRAVENOUS SOLUTION INTRAVENOUS ONCE
Status: COMPLETED | OUTPATIENT
Start: 2021-12-13 | End: 2021-12-13

## 2021-12-13 RX ORDER — POTASSIUM CHLORIDE 20 MEQ/1
40 TABLET, EXTENDED RELEASE ORAL ONCE
Status: COMPLETED | OUTPATIENT
Start: 2021-12-13 | End: 2021-12-13

## 2021-12-13 RX ORDER — MIDAZOLAM HYDROCHLORIDE 1 MG/ML
INJECTION INTRAMUSCULAR; INTRAVENOUS PRN
Status: DISCONTINUED | OUTPATIENT
Start: 2021-12-13 | End: 2021-12-13 | Stop reason: SDUPTHER

## 2021-12-13 RX ORDER — OXYCODONE HYDROCHLORIDE AND ACETAMINOPHEN 5; 325 MG/1; MG/1
2 TABLET ORAL PRN
Status: COMPLETED | OUTPATIENT
Start: 2021-12-13 | End: 2021-12-13

## 2021-12-13 RX ORDER — DOCUSATE SODIUM 100 MG/1
100 CAPSULE, LIQUID FILLED ORAL DAILY PRN
Qty: 7 CAPSULE | Refills: 0 | Status: SHIPPED | OUTPATIENT
Start: 2021-12-13 | End: 2021-12-20

## 2021-12-13 RX ORDER — PROPOFOL 10 MG/ML
INJECTION, EMULSION INTRAVENOUS PRN
Status: DISCONTINUED | OUTPATIENT
Start: 2021-12-13 | End: 2021-12-13 | Stop reason: SDUPTHER

## 2021-12-13 RX ORDER — 0.9 % SODIUM CHLORIDE 0.9 %
500 INTRAVENOUS SOLUTION INTRAVENOUS
Status: DISCONTINUED | OUTPATIENT
Start: 2021-12-13 | End: 2021-12-13 | Stop reason: HOSPADM

## 2021-12-13 RX ORDER — MORPHINE SULFATE 2 MG/ML
2 INJECTION, SOLUTION INTRAMUSCULAR; INTRAVENOUS ONCE
Status: COMPLETED | OUTPATIENT
Start: 2021-12-13 | End: 2021-12-13

## 2021-12-13 RX ORDER — MAGNESIUM SULFATE 1 G/100ML
1000 INJECTION INTRAVENOUS ONCE
Status: COMPLETED | OUTPATIENT
Start: 2021-12-13 | End: 2021-12-13

## 2021-12-13 RX ORDER — DEXAMETHASONE SODIUM PHOSPHATE 10 MG/ML
INJECTION INTRAMUSCULAR; INTRAVENOUS PRN
Status: DISCONTINUED | OUTPATIENT
Start: 2021-12-13 | End: 2021-12-13 | Stop reason: SDUPTHER

## 2021-12-13 RX ORDER — 0.9 % SODIUM CHLORIDE 0.9 %
80 INTRAVENOUS SOLUTION INTRAVENOUS ONCE
Status: COMPLETED | OUTPATIENT
Start: 2021-12-13 | End: 2021-12-13

## 2021-12-13 RX ORDER — DIPHENHYDRAMINE HYDROCHLORIDE 50 MG/ML
12.5 INJECTION INTRAMUSCULAR; INTRAVENOUS
Status: DISCONTINUED | OUTPATIENT
Start: 2021-12-13 | End: 2021-12-13 | Stop reason: HOSPADM

## 2021-12-13 RX ORDER — OXYCODONE HYDROCHLORIDE AND ACETAMINOPHEN 5; 325 MG/1; MG/1
1 TABLET ORAL PRN
Status: COMPLETED | OUTPATIENT
Start: 2021-12-13 | End: 2021-12-13

## 2021-12-13 RX ADMIN — CEFAZOLIN SODIUM 2000 MG: 2 SOLUTION INTRAVENOUS at 15:54

## 2021-12-13 RX ADMIN — BUPIVACAINE 20 ML: 13.3 INJECTION, SUSPENSION, LIPOSOMAL INFILTRATION at 15:02

## 2021-12-13 RX ADMIN — MIDAZOLAM HYDROCHLORIDE 2 MG: 1 INJECTION, SOLUTION INTRAMUSCULAR; INTRAVENOUS at 14:56

## 2021-12-13 RX ADMIN — SODIUM CHLORIDE: 9 INJECTION, SOLUTION INTRAVENOUS at 03:30

## 2021-12-13 RX ADMIN — ONDANSETRON 4 MG: 2 INJECTION INTRAMUSCULAR; INTRAVENOUS at 16:42

## 2021-12-13 RX ADMIN — POTASSIUM CHLORIDE 40 MEQ: 1500 TABLET, EXTENDED RELEASE ORAL at 02:58

## 2021-12-13 RX ADMIN — MAGNESIUM SULFATE HEPTAHYDRATE 1000 MG: 1 INJECTION, SOLUTION INTRAVENOUS at 02:56

## 2021-12-13 RX ADMIN — INDOCYANINE GREEN AND WATER 7.5 MG: KIT at 15:47

## 2021-12-13 RX ADMIN — SODIUM CHLORIDE 125 ML/HR: 9 INJECTION, SOLUTION INTRAVENOUS at 10:38

## 2021-12-13 RX ADMIN — FENTANYL CITRATE 100 MCG: 50 INJECTION, SOLUTION INTRAMUSCULAR; INTRAVENOUS at 14:57

## 2021-12-13 RX ADMIN — ONDANSETRON 4 MG: 2 INJECTION INTRAMUSCULAR; INTRAVENOUS at 01:48

## 2021-12-13 RX ADMIN — SODIUM CHLORIDE, POTASSIUM CHLORIDE, SODIUM LACTATE AND CALCIUM CHLORIDE: 600; 310; 30; 20 INJECTION, SOLUTION INTRAVENOUS at 15:47

## 2021-12-13 RX ADMIN — PROPOFOL 200 MG: 10 INJECTION, EMULSION INTRAVENOUS at 15:50

## 2021-12-13 RX ADMIN — SODIUM CHLORIDE 80 ML: 9 INJECTION, SOLUTION INTRAVENOUS at 02:33

## 2021-12-13 RX ADMIN — KETOROLAC TROMETHAMINE 30 MG: 30 INJECTION, SOLUTION INTRAMUSCULAR; INTRAVENOUS at 16:42

## 2021-12-13 RX ADMIN — ROCURONIUM BROMIDE 50 MG: 10 INJECTION INTRAVENOUS at 15:50

## 2021-12-13 RX ADMIN — SODIUM CHLORIDE 1000 ML: 9 INJECTION, SOLUTION INTRAVENOUS at 01:48

## 2021-12-13 RX ADMIN — MIDAZOLAM HYDROCHLORIDE 2 MG: 1 INJECTION, SOLUTION INTRAMUSCULAR; INTRAVENOUS at 15:47

## 2021-12-13 RX ADMIN — HYDROMORPHONE HYDROCHLORIDE 0.5 MG: 1 INJECTION, SOLUTION INTRAMUSCULAR; INTRAVENOUS; SUBCUTANEOUS at 17:05

## 2021-12-13 RX ADMIN — POTASSIUM CHLORIDE 10 MEQ: 7.46 INJECTION, SOLUTION INTRAVENOUS at 02:57

## 2021-12-13 RX ADMIN — SODIUM CHLORIDE, PRESERVATIVE FREE 10 ML: 5 INJECTION INTRAVENOUS at 02:34

## 2021-12-13 RX ADMIN — SODIUM CHLORIDE, POTASSIUM CHLORIDE, SODIUM LACTATE AND CALCIUM CHLORIDE: 600; 310; 30; 20 INJECTION, SOLUTION INTRAVENOUS at 16:02

## 2021-12-13 RX ADMIN — MORPHINE SULFATE 4 MG: 4 INJECTION INTRAVENOUS at 01:49

## 2021-12-13 RX ADMIN — FENTANYL CITRATE 100 MCG: 50 INJECTION, SOLUTION INTRAMUSCULAR; INTRAVENOUS at 16:15

## 2021-12-13 RX ADMIN — Medication 0.4 MG: at 16:42

## 2021-12-13 RX ADMIN — IOPAMIDOL 75 ML: 755 INJECTION, SOLUTION INTRAVENOUS at 02:34

## 2021-12-13 RX ADMIN — OXYCODONE HYDROCHLORIDE AND ACETAMINOPHEN 2 TABLET: 5; 325 TABLET ORAL at 17:49

## 2021-12-13 RX ADMIN — DEXAMETHASONE SODIUM PHOSPHATE 10 MG: 10 INJECTION INTRAMUSCULAR; INTRAVENOUS at 16:17

## 2021-12-13 RX ADMIN — MORPHINE SULFATE 2 MG: 2 INJECTION, SOLUTION INTRAMUSCULAR; INTRAVENOUS at 07:25

## 2021-12-13 RX ADMIN — BUPIVACAINE HYDROCHLORIDE 60 ML: 2.5 INJECTION, SOLUTION EPIDURAL; INFILTRATION; INTRACAUDAL; PERINEURAL at 15:02

## 2021-12-13 RX ADMIN — Medication 3 MG: at 16:42

## 2021-12-13 RX ADMIN — MIDAZOLAM HYDROCHLORIDE 2 MG: 2 INJECTION, SOLUTION INTRAMUSCULAR; INTRAVENOUS at 14:56

## 2021-12-13 RX ADMIN — LIDOCAINE HYDROCHLORIDE 50 MG: 10 INJECTION, SOLUTION EPIDURAL; INFILTRATION; INTRACAUDAL at 15:50

## 2021-12-13 ASSESSMENT — PULMONARY FUNCTION TESTS
PIF_VALUE: 3
PIF_VALUE: 18
PIF_VALUE: 26
PIF_VALUE: 25
PIF_VALUE: 26
PIF_VALUE: 19
PIF_VALUE: 4
PIF_VALUE: 24
PIF_VALUE: 25
PIF_VALUE: 1
PIF_VALUE: 25
PIF_VALUE: 26
PIF_VALUE: 17
PIF_VALUE: 1
PIF_VALUE: 25
PIF_VALUE: 19
PIF_VALUE: 27
PIF_VALUE: 25
PIF_VALUE: 19
PIF_VALUE: 24
PIF_VALUE: 20
PIF_VALUE: 26
PIF_VALUE: 25
PIF_VALUE: 8
PIF_VALUE: 24
PIF_VALUE: 18
PIF_VALUE: 25
PIF_VALUE: 19
PIF_VALUE: 18
PIF_VALUE: 24
PIF_VALUE: 22
PIF_VALUE: 19
PIF_VALUE: 25
PIF_VALUE: 25
PIF_VALUE: 13
PIF_VALUE: 26
PIF_VALUE: 18
PIF_VALUE: 18
PIF_VALUE: 25
PIF_VALUE: 26
PIF_VALUE: 25
PIF_VALUE: 24
PIF_VALUE: 25
PIF_VALUE: 26
PIF_VALUE: 18
PIF_VALUE: 26
PIF_VALUE: 18
PIF_VALUE: 18
PIF_VALUE: 26
PIF_VALUE: 1
PIF_VALUE: 1
PIF_VALUE: 25
PIF_VALUE: 13
PIF_VALUE: 19
PIF_VALUE: 19
PIF_VALUE: 18
PIF_VALUE: 18
PIF_VALUE: 25
PIF_VALUE: 18
PIF_VALUE: 1
PIF_VALUE: 6
PIF_VALUE: 26
PIF_VALUE: 25
PIF_VALUE: 16

## 2021-12-13 ASSESSMENT — PAIN SCALES - GENERAL
PAINLEVEL_OUTOF10: 10
PAINLEVEL_OUTOF10: 3
PAINLEVEL_OUTOF10: 6
PAINLEVEL_OUTOF10: 7
PAINLEVEL_OUTOF10: 10
PAINLEVEL_OUTOF10: 6
PAINLEVEL_OUTOF10: 8
PAINLEVEL_OUTOF10: 8
PAINLEVEL_OUTOF10: 10
PAINLEVEL_OUTOF10: 6

## 2021-12-13 ASSESSMENT — PAIN DESCRIPTION - PAIN TYPE
TYPE: SURGICAL PAIN
TYPE: ACUTE PAIN
TYPE: ACUTE PAIN
TYPE: SURGICAL PAIN
TYPE: ACUTE PAIN

## 2021-12-13 ASSESSMENT — PAIN DESCRIPTION - DESCRIPTORS
DESCRIPTORS: SORE
DESCRIPTORS: ACHING
DESCRIPTORS: SORE
DESCRIPTORS: BURNING;CONSTANT

## 2021-12-13 ASSESSMENT — PAIN DESCRIPTION - ORIENTATION: ORIENTATION: MID

## 2021-12-13 ASSESSMENT — PAIN DESCRIPTION - LOCATION
LOCATION: ABDOMEN

## 2021-12-13 ASSESSMENT — PAIN DESCRIPTION - FREQUENCY: FREQUENCY: CONTINUOUS

## 2021-12-13 ASSESSMENT — PAIN DESCRIPTION - PROGRESSION: CLINICAL_PROGRESSION: GRADUALLY IMPROVING

## 2021-12-13 ASSESSMENT — LIFESTYLE VARIABLES: SMOKING_STATUS: 1

## 2021-12-13 ASSESSMENT — PAIN - FUNCTIONAL ASSESSMENT
PAIN_FUNCTIONAL_ASSESSMENT: PREVENTS OR INTERFERES SOME ACTIVE ACTIVITIES AND ADLS
PAIN_FUNCTIONAL_ASSESSMENT: 0-10

## 2021-12-13 NOTE — ED NOTES
To OR with Cierra Waterman.     INT per surgery request.   Kevin Paniagua RN  12/13/21 Diana De Sanitago, ETTA  12/13/21 5569

## 2021-12-13 NOTE — ANESTHESIA PROCEDURE NOTES
Peripheral Block    Patient location during procedure: pre-op  Start time: 12/13/2021 2:59 PM  End time: 12/13/2021 3:02 PM  Staffing  Performed: anesthesiologist   Anesthesiologist: Rubin Bustillos MD  Preanesthetic Checklist  Completed: patient identified, IV checked, site marked, risks and benefits discussed, surgical consent, monitors and equipment checked, pre-op evaluation, timeout performed, anesthesia consent given, oxygen available and patient being monitored  Peripheral Block  Patient position: supine  Prep: ChloraPrep  Patient monitoring: cardiac monitor, continuous pulse ox and frequent blood pressure checks  Block type: TAP  Laterality: bilateral  Injection technique: single-shot  Guidance: ultrasound guided  Provider prep: mask and sterile gloves  Needle  Needle type: combined needle/nerve stimulator   Needle gauge: 20 G  Needle length: 4 IN.   Needle localization: anatomical landmarks and ultrasound guidance  Assessment  Injection assessment: no paresthesia on injection, negative aspiration for heme and local visualized surrounding nerve on ultrasound  Paresthesia pain: none  Slow fractionated injection: yes  Hemodynamics: stable  Medications Administered  Bupivacaine liposome (EXPAREL) injection 1.3%, 20 mL  bupivacaine (MARCAINE) PF injection 0.25%, 60 mL  Reason for block: post-op pain management and at surgeon's request

## 2021-12-13 NOTE — H&P
Chief Complaint   Patient presents with    Abdominal Pain       HxCC:  47 y/o female presents to emergency department for evaluation of abdominal pain. Patient with 14 yr hx of epigastric and right upper quadrant pain. Patient had work up at [facility] and patient states ultrasound showed gallbladder sludge. Was recommended to have cholecystectomy at that time but patient deferred. Patient with constant epigastric and right upper quadrant tightness and bloating. Pain is worse with fatty or fried foods. Patient with associated nausea that is worse with eating. Patient with hx of gerd. Patient states these symptoms are different than gerd symptoms. Patient notes pain radiates to back and right shoulder. Vitals:    21 0508   BP: 135/88   Pulse: 102   Resp: 18   Temp:    SpO2: 99%       There is no problem list on file for this patient. Current Facility-Administered Medications   Medication Dose Route Frequency Provider Last Rate Last Admin    sodium chloride flush 0.9 % injection 10 mL  10 mL IntraVENous PRN [facility], DO   10 mL at 21 0234    0.9 % sodium chloride infusion   IntraVENous Continuous [facility],  mL/hr at 21 0330 New Bag at 21 0330     Current Outpatient Medications   Medication Sig Dispense Refill    butalbital-acetaminophen-caffeine-codeine (FIORICET WITH CODEINE) -81-30 MG per capsule Take 1 capsule by mouth every 4 hours.  Cholecalciferol (VITAMIN D3) 09900 units CAPS Take by mouth      diazepam (VALIUM) 5 MG tablet Take 5 mg by mouth every 6 hours as needed for Anxiety      busPIRone (BUSPAR) 15 MG tablet Take 30 mg by mouth 2 times daily         No Known Allergies    Past Medical History:   Diagnosis Date    Anxiety     Anxiety        Past Surgical History:   Procedure Laterality Date     SECTION      TUBAL LIGATION         History reviewed. No pertinent family history.     Social History Socioeconomic History    Marital status: Single     Spouse name: Not on file    Number of children: Not on file    Years of education: Not on file    Highest education level: Not on file   Occupational History    Not on file   Tobacco Use    Smoking status: Current Every Day Smoker     Packs/day: 0.50     Types: Cigarettes    Smokeless tobacco: Never Used   Vaping Use    Vaping Use: Never used   Substance and Sexual Activity    Alcohol use: Yes     Comment: social    Drug use: No    Sexual activity: Not on file   Other Topics Concern    Not on file   Social History Narrative    Not on file     Social Determinants of Health     Financial Resource Strain:     Difficulty of Paying Living Expenses: Not on file   Food Insecurity:     Worried About Running Out of Food in the Last Year: Not on file    Amena of Food in the Last Year: Not on file   Transportation Needs:     Lack of Transportation (Medical): Not on file    Lack of Transportation (Non-Medical): Not on file   Physical Activity:     Days of Exercise per Week: Not on file    Minutes of Exercise per Session: Not on file   Stress:     Feeling of Stress : Not on file   Social Connections:     Frequency of Communication with Friends and Family: Not on file    Frequency of Social Gatherings with Friends and Family: Not on file    Attends Jewish Services: Not on file    Active Member of [address] [place] [address] or Organizations: Not on file    Attends Club or Organization Meetings: Not on file    Marital Status: Not on file   Intimate Partner Violence:     Fear of Current or Ex-Partner: Not on file    Emotionally Abused: Not on file    Physically Abused: Not on file    Sexually Abused: Not on file   Housing Stability:     Unable to Pay for Housing in the Last Year: Not on file    Number of [place] in the Last Year: Not on file    Unstable Housing in the Last Year: Not on file       Review of Systems:  14 systems were reviewed.   Positives noted above. Remainder are negative per CMS guidelines. Exam  General: AAOx3, NAD  Head: atraumatic normocephalic  Neck: trachea midline. No masses or lymphadenopathy  Heart: RRR with no murmurs  Lungs: BCTA  Abdomen: soft and nondistended. There is mild epigastric and right upper quadrant tenderness to palpation but no guarding, no rebound, and no rigidity. Ext: motor 5/5 all extremities with no gross deformities    Impression:  Symptomatic cholelithiasis      Plan:  Based on patient's history and physical exam patient has symptomatic cholelithiasis and not acute cholecystitis. By report, patient with hx of gallbladder sludge. I offered patient surgery. I explained to patient that here symptoms are consistent with symptomatic cholelithiasis however there is a chance her symptoms will not resolve with surgery. Patient understands and would like to proceed with surgery. Will plan to add patient on today for cholecystectomy. Patient informed of the risks of surgery which include but not limited to bleeding, infection, bile leak, common bile duct injury, need for additional procedures, need for open operation, chronic pain, and risks of anesthesia. Patient understood risks and signed informed consent for laparoscopic robotic assisted cholecystectomy under general anesthesia.       Electronically signed by Opal Morgan IV, DO on 12/13/21 at 6:38 AM EST

## 2021-12-13 NOTE — ED PROVIDER NOTES
[facility]  [address] [facility] [address]  Phone: [phone]      Pt Name: Renell Alpers  [id number]  Sarah 1980  Date of evaluation: 2021      CHIEF COMPLAINT       Chief Complaint   Patient presents with    Abdominal Pain       HISTORY OF PRESENT ILLNESS   Renell Alpers is a 46 y.o. female with history of gallbladder sludge, , tubal ligation, and constipation who presents for evaluation of right upper quadrant abdominal pain, nausea and vomiting. The patient reports that 14 years ago she had a lot of right upper quadrant abdominal pain with associated nausea and vomiting and had several different tests on her gallbladder including HIDA scan which showed gallbladder sludge. It was recommended that she have her gallbladder removed at that time but she states that she modified her diet and her symptoms resolved. She states that starting approximately 1 month ago she began having intermittent, sharp, stabbing, nonradiating, right upper quadrant abdominal pain with associated nausea and vomiting that is triggered by eating. She states that for the past 3 days she has had constant, sharp, stabbing, right upper quadrant abdominal pain with approximately 4 episodes of nausea and vomiting per day. She has not followed up with a surgeon. She is currently not taking any medications for her symptoms. She complained of subjective fever but states that when she did take her temperature it was normal.  The patient has also had intermittent constipation and diarrhea. She denies any headache, vision changes, neck pain, back pain, chest pain, shortness of breath, abdominal injury, urinary symptoms, vaginal bleeding, vaginal discharge, hematochezia, melena, recent injury or illness.     REVIEW OF SYSTEMS     Ten point review of systems was reviewed and is negative unless otherwise noted in the HPI    [address]    has a past medical history of Anxiety and Anxiety. SURGICAL HISTORY      has a past surgical history that includes  section and Tubal ligation. CURRENT MEDICATIONS       Current Discharge Medication List      CONTINUE these medications which have NOT CHANGED    Details   diazepam (VALIUM) 5 MG tablet Take 5 mg by mouth every 6 hours as needed for Anxiety      busPIRone (BUSPAR) 15 MG tablet Take 30 mg by mouth 2 times daily      butalbital-acetaminophen-caffeine-codeine (FIORICET WITH CODEINE) -40-30 MG per capsule Take 1 capsule by mouth every 4 hours. Cholecalciferol (VITAMIN D3) 89547 units CAPS Take by mouth             ALLERGIES     has No Known Allergies. FAMILY HISTORY     has no family status information on file. family history is not on file. SOCIAL HISTORY      reports that she has been smoking cigarettes. She has been smoking about 0.50 packs per day. She has never used smokeless tobacco. She reports current alcohol use. She reports that she does not use drugs. PHYSICAL EXAM     INITIAL VITALS:  height is 5' 2\" (1.575 m) and weight is 59.5 kg (131 lb 4 oz). Her infrared temperature is 97.5 °F (36.4 °C). Her blood pressure is 144/105 (abnormal) and her pulse is 83. Her respiration is 19 and oxygen saturation is 98%. CONSTITUTIONAL: Appears uncomfortable, nontoxic  SKIN: warm, dry, no jaundice, hives or petechiae  EYES: clear conjunctiva, non-icteric sclera  HENT: normocephalic, atraumatic, moist mucus membranes  NECK: Nontender and supple with no nuchal rigidity, full range of motion  PULMONARY: clear to auscultation without wheezes, rhonchi, or rales, normal excursion, no accessory muscle use and no stridor  CARDIOVASCULAR: regular rate, rhythm. Strong radial pulses with intact distal perfusion. Capillary refill <2 seconds.   GASTROINTESTINAL: soft, right upper quadrant tenderness to palpation, positive Dickey sign, no pain over McBurney's point, no pulsatile mass non-distended, no palpable masses, no rebound or guarding   GENITOURINARY: No costovertebral angle tenderness to palpation  MUSCULOSKELETAL: No midline spinal tenderness, step off or deformity. Extremities are otherwise nontender to palpation and nonerythematous. Compartments soft. No peripheral edema. NEUROLOGIC: alert and oriented x 3, GCS 15, normal mentation and speech. Moves all extremities x 4 without motor or sensory deficit, gait is stable without ataxia  PSYCHIATRIC: normal mood and affect, thought process is clear and linear    DIAGNOSTIC RESULTS     EKG:  None    RADIOLOGY:   CT ABDOMEN PELVIS W IV CONTRAST Additional Contrast? None    Result Date: 12/13/2021  EXAMINATION: CT OF THE ABDOMEN AND PELVIS WITH CONTRAST 12/13/2021 1:44 am TECHNIQUE: CT of the abdomen and pelvis was performed with the administration of intravenous contrast. Multiplanar reformatted images are provided for review. Dose modulation, iterative reconstruction, and/or weight based adjustment of the mA/kV was utilized to reduce the radiation dose to as low as reasonably achievable. COMPARISON: 08/16/2016 HISTORY: ORDERING SYSTEM PROVIDED HISTORY: RUQ pain, h/o gall bladder sludge TECHNOLOGIST PROVIDED HISTORY: RUQ pain, h/o gall bladder sludge Decision Support Exception - unselect if not a suspected or confirmed emergency medical condition->Emergency Medical Condition (MA) Reason for Exam: RUQ pain Additional signs and symptoms: h/o gallbladder sludge FINDINGS: Lower Chest: Lung bases clear. Organs: Marked diffuse hepatic steatosis. Unremarkable spleen, pancreas, adrenals, and bilateral kidneys. GI/Bowel: No definite cholelithiasis bowel loops nonobstructed. Multiple fluid-filled small bowel loops noted, nonspecific. No CT evidence of acute appendicitis. Pelvis: A 4.0 x 3.5 cm cystic lesion in the left adnexa, amenable to further evaluation by ultrasound. Urinary bladder grossly intact. Peritoneum/Retroperitoneum: No free air or free fluid. No adenopathy.  Intact abdominal aorta and its major branches. Bones/Soft Tissues: No acute osseous abnormality. L2 is a limbus vertebra. A cystic lesion in the left adnexa, amenable to further evaluation by pelvic ultrasound. Multiple fluid-filled small bowel loops, nonspecific finding which can be seen with acute enteritis. No other acute finding in the abdomen and pelvis RECOMMENDATIONS: Unavailable       LABS:  Results for orders placed or performed during the hospital encounter of 12/13/21   COVID-19, Rapid    Specimen: Nasopharyngeal Swab   Result Value Ref Range    Specimen Description . NASOPHARYNGEAL SWAB     SARS-CoV-2, Rapid Not Detected Not Detected   CBC Auto Differential   Result Value Ref Range    WBC 9.6 3.5 - 11.0 k/uL    RBC 3.89 (L) 4.0 - 5.2 m/uL    Hemoglobin 12.7 12.0 - 16.0 g/dL    Hematocrit 38.0 36 - 46 %    MCV 97.7 80 - 100 fL    MCH 32.6 26 - 34 pg    MCHC 33.4 31 - 37 g/dL    RDW 13.5 12.5 - 15.4 %    Platelets 449 599 - 618 k/uL    MPV 10.0 8.0 - 14.0 fL    NRBC Automated NOT REPORTED per 100 WBC    Differential Type NOT REPORTED     Immature Granulocytes NOT REPORTED 0 %    Absolute Immature Granulocyte NOT REPORTED 0.00 - 0.30 k/uL    WBC Morphology NOT REPORTED     RBC Morphology NOT REPORTED     Platelet Estimate NOT REPORTED     Seg Neutrophils 52 36 - 66 %    Lymphocytes 31 24 - 44 %    Monocytes 14 (H) 1 - 7 %    Eosinophils % 3 1 - 4 %    Basophils 0 0 - 2 %    Segs Absolute 4.99 1.8 - 7.7 k/uL    Absolute Lymph # 2.98 1.0 - 4.8 k/uL    Absolute Mono # 1.34 (H) 0.1 - 0.8 k/uL    Absolute Eos # 0.29 0.0 - 0.4 k/uL    Basophils Absolute 0.00 0.0 - 0.2 k/uL    Morphology Normal    Basic Metabolic Panel w/ Reflex to MG   Result Value Ref Range    Glucose 114 (H) 70 - 99 mg/dL    BUN 9 6 - 20 mg/dL    CREATININE 0.50 0.50 - 0.90 mg/dL    Bun/Cre Ratio NOT REPORTED 9 - 20    Calcium 9.1 8.6 - 10.4 mg/dL    Sodium 140 135 - 144 mmol/L    Potassium 2.6 (LL) 3.7 - 5.3 mmol/L    Chloride 104 98 - 107 mmol/L CO2 20 20 - 31 mmol/L    Anion Gap 16 9 - 17 mmol/L    GFR Non-African American >60 >60 mL/min    GFR African American >60 >60 mL/min    GFR Comment          GFR Staging NOT REPORTED    Hepatic Function Panel   Result Value Ref Range    Albumin 4.6 3.5 - 5.2 g/dL    Alkaline Phosphatase 71 35 - 104 U/L    ALT 50 (H) 5 - 33 U/L    AST 44 (H) <32 U/L    Total Bilirubin 0.50 0.3 - 1.2 mg/dL    Bilirubin, Direct 0.12 <0.31 mg/dL    Bilirubin, Indirect 0.38 0.00 - 1.00 mg/dL    Total Protein 7.3 6.4 - 8.3 g/dL    Globulin NOT REPORTED 1.5 - 3.8 g/dL    Albumin/Globulin Ratio 1.7 1.0 - 2.5   Lipase   Result Value Ref Range    Lipase 20 13 - 60 U/L   Urinalysis Reflex to Culture    Specimen: Urine, clean catch   Result Value Ref Range    Color, UA Yellow Yellow    Turbidity UA Clear Clear    Glucose, Ur NEGATIVE NEGATIVE    Bilirubin Urine NEGATIVE NEGATIVE    Ketones, Urine NEGATIVE NEGATIVE    Specific Gravity, UA 1.010 1.005 - 1.030    Urine Hgb NEGATIVE NEGATIVE    pH, UA 5.5 5.0 - 8.0    Protein, UA NEGATIVE NEGATIVE    Urobilinogen, Urine Normal Normal    Nitrite, Urine NEGATIVE NEGATIVE    Leukocyte Esterase, Urine NEGATIVE NEGATIVE    Urinalysis Comments       Microscopic exam not performed based on chemical results unless requested in original order. Urinalysis Comments          Urinalysis Comments       Utilizing a urinalysis as the only screening method to exclude a potential uropathogen can be unreliable in many patient populations. Rapid screening tests are less sensitive than culture and if UTI is a clinical possibility, culture should be considered despite a negative urinalysis.    HCG Qualitative, Serum   Result Value Ref Range    hCG Qual NEGATIVE NEGATIVE   Magnesium   Result Value Ref Range    Magnesium 2.4 1.6 - 2.6 mg/dL   Potassium   Result Value Ref Range    Potassium 3.9 3.7 - 5.3 mmol/L       EMERGENCY DEPARTMENT COURSE:     ED Course as of 12/13/21 1444   Mon Dec 13, 2021   0330 I updated Dr. Hilary Major on the patient's symptoms and results. He agrees that the patient is likely having biliary colic and recommends cholecystectomy. He states that he can add the patient onto his schedule this afternoon and he will admit the patient primarily. I explained that we are currently boarding patient's in the ER because there are no beds upstairs. He states that he will place admit orders when he comes in in the morning and he will wait to give the patient antibiotics until she is in the OR.  He asks that the patient remain NPO.  [AM]      ED Course User Index  [AM] Whitney Allan DO       The patient was given the following medications:  Orders Placed This Encounter   Medications    0.9 % sodium chloride bolus    ondansetron (ZOFRAN) injection 4 mg    morphine injection 4 mg    0.9 % sodium chloride bolus    iopamidol (ISOVUE-370) 76 % injection 75 mL    sodium chloride flush 0.9 % injection 10 mL    potassium chloride (KLOR-CON M) extended release tablet 40 mEq    potassium chloride 10 mEq/100 mL IVPB (Peripheral Line)    magnesium sulfate 1000 mg in dextrose 5% 100 mL IVPB    0.9 % sodium chloride infusion    morphine (PF) injection 2 mg    lactated ringers infusion    sodium chloride flush 0.9 % injection 10 mL    sodium chloride flush 0.9 % injection 10 mL    0.9 % sodium chloride infusion    lidocaine PF 1 % injection 1 mL    ceFAZolin (ANCEF) IVPB     Isha Kendall: cabinet override    meperidine (DEMEROL) injection 12.5 mg    morphine (PF) injection 2 mg    HYDROmorphone (DILAUDID) injection 0.5 mg    HYDROmorphone (DILAUDID) injection 0.25 mg    HYDROmorphone (DILAUDID) injection 0.5 mg    OR Linked Order Group     oxyCODONE-acetaminophen (PERCOCET) 5-325 MG per tablet 1 tablet     oxyCODONE-acetaminophen (PERCOCET) 5-325 MG per tablet 2 tablet    ondansetron (ZOFRAN) injection 4 mg    promethazine (PHENERGAN) injection 6.25 mg    0.9 % sodium chloride bolus    diphenhydrAMINE (BENADRYL) injection 12.5 mg    labetalol (NORMODYNE;TRANDATE) injection syringe 10 mg    hydrALAZINE (APRESOLINE) injection 10 mg    midazolam PF (VERSED) injection 1 mg    midazolam (VERSED) 2 MG/2ML injection     Sandy Thermal: cabinet override    fentaNYL (SUBLIMAZE) 100 MCG/2ML injection     Sandy Thermal: cabinet override        Vitals:    Vitals:    12/13/21 0718 12/13/21 0934 12/13/21 1314 12/13/21 1331   BP: (!) 141/107 (!) 139/99  (!) 144/105   Pulse: 95 88  83   Resp: 16 16  19   Temp: 98.2 °F (36.8 °C)  97.5 °F (36.4 °C)    TempSrc:   Infrared    SpO2: 98% 97%  98%   Weight:   59.5 kg (131 lb 4 oz)    Height:         -------------------------  BP: (!) 144/105, Temp: 97.5 °F (36.4 °C), Pulse: 83, Resp: 19    CONSULTS:  None    CRITICAL CARE:   None    PROCEDURES:  None    DIAGNOSIS/ MDM:   Pallavi Reyan is a 46 y.o. female who presents with right upper quadrant abdominal pain with associated nausea and vomiting. Vital signs are stable. She has tenderness to palpation over the right upper quadrant with positive Dickey sign. There is no pain over the lower abdomen and she denies any vaginal complaints. Urinalysis, CBC, lipase and hCG unremarkable. AST and ALT are mildly elevated. BMP reveals hypokalemia but she was given IV potassium, p.o. potassium, and IV magnesium. Repeat potassium was normal.  CT abdomen pelvis reveals a cystic lesion in the left adnexa but she is nontender in this region and this will need to be followed up outpatient. The patient was updated on this. CT abdomen also shows multiple fluid-filled small bowel loops which are nonspecific. No other acute findings in the abdomen and pelvis. Based on the patient's symptoms and previous HIDA scan this is suspect she is having biliary colic. I spoke to the general surgeon on-call, Dr. Sylvia Rogers, at 3:30 AM and he recommends the patient undergo cholecystectomy later today. The patient is agreeable with this.    Eddie agreed to admit the patient primarily. We will continue to provide pain and nausea control while she was boarding the ER. FINAL IMPRESSION      1. Biliary colic    2. Abdominal pain, right upper quadrant    3. Adnexal cyst    4. Non-intractable vomiting with nausea, unspecified vomiting type    5.  Hypokalemia          DISPOSITION/PLAN   DISPOSITION Decision To Admit 12/13/2021 03:33:26 AM        (Please note that portions of this note were completed with a voice recognitionprogram.  Efforts were made to edit the dictations but occasionally words are mis-transcribed.)    Rony Metcalf DO, [address] [facility]  Emergency Physician Attending         Rony Metcalf DO  12/13/21 2729

## 2021-12-13 NOTE — OP NOTE
Operative Note      Patient: Pop Pierre  YOB: 1980  MRN: [id number]    Date of Procedure: 12/13/2021    Pre-Op Diagnosis: Biliary colic    Post-Op Diagnosis: Same       Procedure(s):  CHOLECYSTECTOMY LAPAROSCOPIC ROBOTIC    Surgeon(s):  Jan Juarez IV, DO    Assistant:   Michael Mckeon DO PGY5; Venkata Landa [place] PGY2    Anesthesia: General    Estimated Blood Loss (mL): Minimal    Complications: None    Specimens:   ID Type Source Tests Collected by Time Destination   A : gallbladder and contents Tissue Gallbladder SURGICAL PATHOLOGY Flako Morgan SHAYLA, DO 12/13/2021 1637        Implants:  * No implants in log *      Drains: * No LDAs found *    Detailed Description of Procedure: The patient was brought to the operative suite and placed in the supine position. A time out was made to verify correct patient, site of procedure and procedure type. A dose of antibiotics were given according to SCIP protocol. EPC cuffs were placed for DVT prophylaxis. A Kathryn Hugger was placed to keep the patient euthermic. General anesthesia was given and the patient was intubated with an ET tube. The patient's abdomen was prepped and draped in sterile fashion. Entrance was gained into the abdomen with a Veress needle placed at Lam's point in the left upper quadrant. A saline drop test was used to confirm entrance into the abdomen. The abdomen was insufflated with carbon dioxide to a pressure of 15 mmHg. A 12 mm Optiview port was then placed into the left upper quadrant. A camera was then placed and the abdomen was inspected to identify any injuries during placement of the left upper quadrant port. No injuries were noted. An 8 mm camera port was placed supraumbilical.  A second 8mm port was placed 10 cm to the right lateral to the supraumbilical port. An assistant port was then placed 10 cm lateral to the right side instrument port. This was another 8 mm port.   The patient was placed in reverse Trendelenburg position with the right side up. The [employer] machine was then docked and a camera placed without complication. A monopolar hook was then placed in the right arm and a fenestrated bipolar grasper was placed in the left arm. Filmy adhesions between the gallbladder and the omentum were lysed carefully with electrocautery. The fundus of the gallbladder was grasped and directed towards the patient's right shoulder. The infundibulum of the gallbladder was grasped with a bipolar grasper. The gallbladder was positioned so that the cystic duct was at a right angle to the common bile duct in order to expose Calot's triangle. The peritoneum between the gallbladder and the liver was taken down by electrocautery to further mobilize the triangle. The fibrous tissue was hooked with meticulous electrocautery. Hook electrocautery was used to identify the cystic duct. The cystic duct was skeletonized with electrocautery. Dissection was continued to locate the cystic artery. The cystic artery was skeletonized with electrocautery. Critical view was obtained in the anterior and posterior window. All fat and fibrous tissue was dissected from the cystohepatic triangle with careful hook electrocautery. The cystic plate of the liver was skeletonized with hook electrocautery. Both the cystic duct and cystic artery were the only structures visualized entering into the gallbladder. Firefly was used to confirm identification of the cystic duct. The critical view of safety was confirmed by myself and the surgical resident. The cystic duct was ligated with triple Hemoclips, and then divided with Endoshears. The cystic artery was double ligated with a Hemoclip and divided with Endoshears. The gallbladder was dissected from the liver bed with electrocautery. The gallbladder was then placed into the Endopouch and delivered from the body at the left upper quadrant incision. The bed was then inspected. Hemostasis was achieved with electrocautery. The cystic duct and artery stumps were identified. All clips remained on both the cystic artery and the cystic duct stump. No sign of bile leak or bleeding from the duct or artery stump. The Susan Simper was undocked from the patient and the abdomen was irrigated with normal saline until clear fluid was returned. The camera and the left upper quadrant port were removed from the abdomen. The left upper quadrant fascia was closed with an 0 Vicryl suture with a needle nose suture passer and a Compa-Bob needle. Working ports were removed. All incisions were closed with simple subcuticular 4-0 Monocryl sutures. Incisions were cleaned and dressed with Dermabond. All sponge, needle and instrument counts were correct prior to closure and the patient tolerated the procedure well without any apparent complication. The patient was extubated and then taken to recovery in stable condition.       Electronically signed by Gianluca Castellanos DO on 12/13/2021 at 4:52 PM

## 2021-12-13 NOTE — ANESTHESIA PRE PROCEDURE
Department of Anesthesiology  Preprocedure Note       Name:  Shannon Kolb   Age:  46 y.o.  :  1980                                          MRN:  [id number]         Date:  2021      Surgeon: Lonna Frankel):  Tara Morgan IV, DO    Procedure: Procedure(s):  CHOLECYSTECTOMY LAPAROSCOPIC ROBOTIC    Medications prior to admission:   Prior to Admission medications    Medication Sig Start Date End Date Taking? Authorizing Provider   diazepam (VALIUM) 5 MG tablet Take 5 mg by mouth every 6 hours as needed for Anxiety   Yes Historical Provider, MD   busPIRone (BUSPAR) 15 MG tablet Take 30 mg by mouth 2 times daily   Yes Historical Provider, MD   butalbital-acetaminophen-caffeine-codeine (FIORICET WITH CODEINE) -83-30 MG per capsule Take 1 capsule by mouth every 4 hours. 18   Historical Provider, MD   Cholecalciferol (VITAMIN D3) 26905 units CAPS Take by mouth    Historical Provider, MD       Current medications:    Current Facility-Administered Medications   Medication Dose Route Frequency Provider Last Rate Last Admin    sodium chloride flush 0.9 % injection 10 mL  10 mL IntraVENous PRN Shirlene Peel, DO   10 mL at 21 0234    0.9 % sodium chloride infusion   IntraVENous Continuous Shirlene Peel, DO   Stopped at 21 1306    lactated ringers infusion   IntraVENous Continuous Keith Lau MD        sodium chloride flush 0.9 % injection 10 mL  10 mL IntraVENous 2 times per day Suzi Monahan MD        sodium chloride flush 0.9 % injection 10 mL  10 mL IntraVENous PRN Suzi Monahan MD        0.9 % sodium chloride infusion  25 mL IntraVENous PRN Suzi Monahan MD        lidocaine PF 1 % injection 1 mL  1 mL IntraDERmal Once PRN Suzi Monahan MD        ceFAZolin (ANCEF) IVPB                Allergies:  No Known Allergies    Problem List:  There is no problem list on file for this patient.       Past Medical History:        Diagnosis Date    Anxiety     Anxiety        Past Surgical History:        Procedure Laterality Date     SECTION      TUBAL LIGATION         Social History:    Social History     Tobacco Use    Smoking status: Current Every Day Smoker     Packs/day: 0.50     Types: Cigarettes    Smokeless tobacco: Never Used   Substance Use Topics    Alcohol use: Yes     Comment: social                                Ready to quit: Not Answered  Counseling given: Not Answered      Vital Signs (Current):   Vitals:    21 0718 21 0934 21 1314 21 1331   BP: (!) 141/107 (!) 139/99  (!) 144/105   Pulse: 95 88  83   Resp: 16 16  19   Temp: 98.2 °F (36.8 °C)  97.5 °F (36.4 °C)    TempSrc:   Infrared    SpO2: 98% 97%  98%   Weight:   131 lb 4 oz (59.5 kg)    Height:                                                  BP Readings from Last 3 Encounters:   21 (!) 144/105   21 (!) 140/90   20 114/68       NPO Status: Time of last liquid consumption: 200                        Time of last solid consumption:                         Date of last liquid consumption: 21                        Date of last solid food consumption: 21    BMI:   Wt Readings from Last 3 Encounters:   21 131 lb 4 oz (59.5 kg)   21 110 lb (49.9 kg)   20 115 lb (52.2 kg)     Body mass index is 24.01 kg/m².     CBC:   Lab Results   Component Value Date    WBC 9.6 2021    RBC 3.89 2021    HGB 12.7 2021    HCT 38.0 2021    MCV 97.7 2021    RDW 13.5 2021     2021       CMP:   Lab Results   Component Value Date     2021    K 3.9 2021     2021    CO2 20 2021    BUN 9 2021    CREATININE 0.50 2021    GFRAA >60 2021    LABGLOM >60 2021    GLUCOSE 114 2021    PROT 7.3 2021    CALCIUM 9.1 2021    BILITOT 0.50 2021    ALKPHOS 71 2021    AST 44 2021    ALT 50 2021       POC Tests: No results for input(s): POCGLU, POCNA, POCK, POCCL, POCBUN, POCHEMO, POCHCT in the last 72 hours. Coags: No results found for: PROTIME, INR, APTT    HCG (If Applicable):   Lab Results   Component Value Date    PREGTESTUR NEGATIVE 08/16/2016        ABGs: No results found for: PHART, PO2ART, DEQ8UIK, XNP5HXB, BEART, I6GNHKXL     Type & Screen (If Applicable):  No results found for: LABABO, LABRH    Drug/Infectious Status (If Applicable):  No results found for: HIV, HEPCAB    COVID-19 Screening (If Applicable):   Lab Results   Component Value Date    COVID19 Not Detected 12/13/2021           Anesthesia Evaluation  Patient summary reviewed and Nursing notes reviewed  Airway: Mallampati: II  TM distance: >3 FB   Neck ROM: full  Mouth opening: > = 3 FB Dental: normal exam         Pulmonary:normal exam    (+) COPD:  current smoker                          ROS comment: -SMOKES/VAPES 1 PPD FOR 26 YEARS   Cardiovascular:Negative CV ROS                      Neuro/Psych:   Negative Neuro/Psych ROS              GI/Hepatic/Renal:   (+) GERD: poorly controlled,          ROS comment: -NAUSEA VOMITING. Endo/Other: Negative Endo/Other ROS                     ROS comment: -NPO AFTER MIDNIGHT  -NKDA Abdominal:             Vascular: negative vascular ROS. Other Findings:             Anesthesia Plan      general and regional     ASA 2     (GETA, TAP BLOCK)  Induction: intravenous. MIPS: Postoperative opioids intended and Prophylactic antiemetics administered. Anesthetic plan and risks discussed with patient. Plan discussed with CRNA.     Attending anesthesiologist reviewed and agrees with Augustus Hawley MD   12/13/2021

## 2021-12-14 NOTE — PROGRESS NOTES
CLINICAL PHARMACY NOTE: MEDS TO BEDS    Total # of Prescriptions Filled: 3   The following medications were delivered to the patient:  · Norco 5-325  · Ondansetron 4mg  · Dok 100mg    Additional Documentation:

## 2021-12-15 LAB — SURGICAL PATHOLOGY REPORT: NORMAL

## 2021-12-29 ENCOUNTER — OFFICE VISIT (OUTPATIENT)
Dept: SURGERY | Age: 41
End: 2021-12-29

## 2021-12-29 VITALS
SYSTOLIC BLOOD PRESSURE: 128 MMHG | HEART RATE: 79 BPM | BODY MASS INDEX: 23.7 KG/M2 | TEMPERATURE: 97.5 F | HEIGHT: 62 IN | DIASTOLIC BLOOD PRESSURE: 76 MMHG | WEIGHT: 128.8 LBS

## 2021-12-29 DIAGNOSIS — Z09 POSTOP CHECK: Primary | ICD-10-CM

## 2021-12-29 PROCEDURE — 99024 POSTOP FOLLOW-UP VISIT: CPT | Performed by: SURGERY

## 2021-12-29 NOTE — PATIENT INSTRUCTIONS
Thank you letting us take care of you today. We hope that all your questions were addressed. If a question was overlooked or something else comes to mind after you return home, please call our office at [phone]. If you need to cancel or change an appointment, surgery or procedure, please contact the office at [phone].

## 2021-12-29 NOTE — PROGRESS NOTES
Visit Information    Have you changed or started any medications since your last visit including any over-the-counter medicines, vitamins, or herbal medicines? no   Have you stopped taking any of your medications? Is so, why? -  no  Are you having any side effects from any of your medications? - no    Have you seen any other physician or provider since your last visit?  no   Have you had any other diagnostic tests since your last visit?  no   Have you been seen in the emergency room and/or had an admission in a hospital since we last saw you?  no   Have you had your routine dental cleaning in the past 6 months?  no     Do you have an active MyChart account? If no, what is the barrier?   Yes    Patient Care Team:  Abigail Tam as PCP - General    Medical History Review  Past Medical, Family, and Social History reviewed and does not contribute to the patient presenting condition    Health Maintenance   Topic Date Due    Hepatitis C screen  Never done    Varicella vaccine (1 of 2 - 2-dose childhood series) Never done    COVID-19 Vaccine (1) Never done    Pneumococcal 0-64 years Vaccine (1 of 2 - PPSV23) Never done    HIV screen  Never done    Cervical cancer screen  Never done    Flu vaccine (1) 09/01/2021    DTaP/Tdap/Td vaccine (2 - Td or Tdap) 09/06/2021    Hepatitis A vaccine  Aged Out    Hepatitis B vaccine  Aged Out    Hib vaccine  Aged Out    Meningococcal (ACWY) vaccine  Aged Out

## 2021-12-29 NOTE — PROGRESS NOTES
Brianne Perez is a 46 y.o. female that is post-operative to Karel Perry after robo neel. Vitals:    12/29/21 1013   BP: 128/76   Pulse: 79   Temp: 97.5 °F (36.4 °C)       Patient doing fairly well, pain and reflux much better but still has a lot of gas and Gasex upset her stomach. Pain Control Good w/ No unusual nausea    Patient has the following restrictions: went back to work      Exam:   Wound: clean, dry, healed  Pathology results and healing process were reviewed with the pt   Rx for ines and will try Riopan.   RTC prn

## 2022-01-14 ENCOUNTER — HOSPITAL ENCOUNTER (EMERGENCY)
Age: 42
Discharge: HOME | End: 2022-01-14
Attending: EMERGENCY MEDICINE
Payer: MEDICARE

## 2022-01-14 ENCOUNTER — APPOINTMENT (OUTPATIENT)
Dept: CT IMAGING | Age: 42
End: 2022-01-14
Payer: MEDICARE

## 2022-01-14 VITALS
BODY MASS INDEX: 23.92 KG/M2 | SYSTOLIC BLOOD PRESSURE: 123 MMHG | RESPIRATION RATE: 16 BRPM | OXYGEN SATURATION: 100 % | TEMPERATURE: 98.1 F | HEART RATE: 79 BPM | WEIGHT: 130 LBS | HEIGHT: 62 IN | DIASTOLIC BLOOD PRESSURE: 93 MMHG

## 2022-01-14 DIAGNOSIS — R10.11 RIGHT UPPER QUADRANT ABDOMINAL PAIN: Primary | ICD-10-CM

## 2022-01-14 LAB
-: ABNORMAL
ABSOLUTE EOS #: 0.1 K/UL (ref 0–0.4)
ABSOLUTE IMMATURE GRANULOCYTE: ABNORMAL K/UL (ref 0–0.3)
ABSOLUTE LYMPH #: 1.7 K/UL (ref 1–4.8)
ABSOLUTE MONO #: 0.5 K/UL (ref 0.1–1.2)
ALBUMIN SERPL-MCNC: 4.1 G/DL (ref 3.5–5.2)
ALBUMIN/GLOBULIN RATIO: 1.6 (ref 1–2.5)
ALP BLD-CCNC: 72 U/L (ref 35–104)
ALT SERPL-CCNC: 34 U/L (ref 5–33)
AMORPHOUS: ABNORMAL
ANION GAP SERPL CALCULATED.3IONS-SCNC: 12 MMOL/L (ref 9–17)
AST SERPL-CCNC: 35 U/L
BACTERIA: ABNORMAL
BASOPHILS # BLD: 1 % (ref 0–2)
BASOPHILS ABSOLUTE: 0.1 K/UL (ref 0–0.2)
BILIRUB SERPL-MCNC: 0.2 MG/DL (ref 0.3–1.2)
BILIRUBIN URINE: NEGATIVE
BUN BLDV-MCNC: 8 MG/DL (ref 6–20)
BUN/CREAT BLD: ABNORMAL (ref 9–20)
CALCIUM SERPL-MCNC: 9 MG/DL (ref 8.6–10.4)
CASTS UA: ABNORMAL /LPF
CHLORIDE BLD-SCNC: 105 MMOL/L (ref 98–107)
CO2: 24 MMOL/L (ref 20–31)
COLOR: YELLOW
COMMENT UA: ABNORMAL
CREAT SERPL-MCNC: 0.58 MG/DL (ref 0.5–0.9)
CRYSTALS, UA: ABNORMAL /HPF
DIFFERENTIAL TYPE: ABNORMAL
EOSINOPHILS RELATIVE PERCENT: 1 % (ref 1–4)
EPITHELIAL CELLS UA: ABNORMAL /HPF (ref 0–5)
GFR AFRICAN AMERICAN: >60 ML/MIN
GFR NON-AFRICAN AMERICAN: >60 ML/MIN
GFR SERPL CREATININE-BSD FRML MDRD: ABNORMAL ML/MIN/{1.73_M2}
GFR SERPL CREATININE-BSD FRML MDRD: ABNORMAL ML/MIN/{1.73_M2}
GLUCOSE BLD-MCNC: 103 MG/DL (ref 70–99)
GLUCOSE URINE: NEGATIVE
HCG(URINE) PREGNANCY TEST: NEGATIVE
HCT VFR BLD CALC: 34.7 % (ref 36–46)
HEMOGLOBIN: 12.1 G/DL (ref 12–16)
IMMATURE GRANULOCYTES: ABNORMAL %
KETONES, URINE: NEGATIVE
LEUKOCYTE ESTERASE, URINE: NEGATIVE
LIPASE: 21 U/L (ref 13–60)
LYMPHOCYTES # BLD: 26 % (ref 24–44)
MAGNESIUM: 1.9 MG/DL (ref 1.6–2.6)
MCH RBC QN AUTO: 34.3 PG (ref 26–34)
MCHC RBC AUTO-ENTMCNC: 34.8 G/DL (ref 31–37)
MCV RBC AUTO: 98.3 FL (ref 80–100)
MONOCYTES # BLD: 8 % (ref 2–11)
MUCUS: ABNORMAL
NITRITE, URINE: NEGATIVE
NRBC AUTOMATED: ABNORMAL PER 100 WBC
OTHER OBSERVATIONS UA: ABNORMAL
PDW BLD-RTO: 14.1 % (ref 12.5–15.4)
PH UA: 5.5 (ref 5–8)
PLATELET # BLD: 282 K/UL (ref 140–450)
PLATELET ESTIMATE: ABNORMAL
PMV BLD AUTO: 8.2 FL (ref 6–12)
POTASSIUM SERPL-SCNC: 3.3 MMOL/L (ref 3.7–5.3)
PROTEIN UA: NEGATIVE
RBC # BLD: 3.53 M/UL (ref 4–5.2)
RBC # BLD: ABNORMAL 10*6/UL
RBC UA: ABNORMAL /HPF (ref 0–2)
RENAL EPITHELIAL, UA: ABNORMAL /HPF
SEG NEUTROPHILS: 64 % (ref 36–66)
SEGMENTED NEUTROPHILS ABSOLUTE COUNT: 4.3 K/UL (ref 1.8–7.7)
SODIUM BLD-SCNC: 141 MMOL/L (ref 135–144)
SPECIFIC GRAVITY UA: 1.02 (ref 1–1.03)
TOTAL PROTEIN: 6.7 G/DL (ref 6.4–8.3)
TRICHOMONAS: ABNORMAL
TROPONIN INTERP: NORMAL
TROPONIN T: NORMAL NG/ML
TROPONIN, HIGH SENSITIVITY: <6 NG/L (ref 0–14)
TURBIDITY: ABNORMAL
URINE HGB: ABNORMAL
UROBILINOGEN, URINE: NORMAL
WBC # BLD: 6.7 K/UL (ref 3.5–11)
WBC # BLD: ABNORMAL 10*3/UL
WBC UA: ABNORMAL /HPF (ref 0–5)
YEAST: ABNORMAL

## 2022-01-14 PROCEDURE — 96374 THER/PROPH/DIAG INJ IV PUSH: CPT

## 2022-01-14 PROCEDURE — 84484 ASSAY OF TROPONIN QUANT: CPT

## 2022-01-14 PROCEDURE — 85025 COMPLETE CBC W/AUTO DIFF WBC: CPT

## 2022-01-14 PROCEDURE — 6360000002 HC RX W HCPCS: Performed by: EMERGENCY MEDICINE

## 2022-01-14 PROCEDURE — 2580000003 HC RX 258: Performed by: EMERGENCY MEDICINE

## 2022-01-14 PROCEDURE — 74177 CT ABD & PELVIS W/CONTRAST: CPT

## 2022-01-14 PROCEDURE — 99284 EMERGENCY DEPT VISIT MOD MDM: CPT

## 2022-01-14 PROCEDURE — 83735 ASSAY OF MAGNESIUM: CPT

## 2022-01-14 PROCEDURE — 81025 URINE PREGNANCY TEST: CPT

## 2022-01-14 PROCEDURE — 36415 COLL VENOUS BLD VENIPUNCTURE: CPT

## 2022-01-14 PROCEDURE — 93005 ELECTROCARDIOGRAM TRACING: CPT | Performed by: EMERGENCY MEDICINE

## 2022-01-14 PROCEDURE — 83690 ASSAY OF LIPASE: CPT

## 2022-01-14 PROCEDURE — 81001 URINALYSIS AUTO W/SCOPE: CPT

## 2022-01-14 PROCEDURE — 80053 COMPREHEN METABOLIC PANEL: CPT

## 2022-01-14 PROCEDURE — 6360000004 HC RX CONTRAST MEDICATION: Performed by: EMERGENCY MEDICINE

## 2022-01-14 RX ORDER — HYDROCODONE BITARTRATE AND ACETAMINOPHEN 5; 325 MG/1; MG/1
1 TABLET ORAL EVERY 6 HOURS PRN
Qty: 12 TABLET | Refills: 0 | Status: SHIPPED | OUTPATIENT
Start: 2022-01-14 | End: 2022-01-17

## 2022-01-14 RX ORDER — 0.9 % SODIUM CHLORIDE 0.9 %
80 INTRAVENOUS SOLUTION INTRAVENOUS ONCE
Status: COMPLETED | OUTPATIENT
Start: 2022-01-14 | End: 2022-01-14

## 2022-01-14 RX ORDER — 0.9 % SODIUM CHLORIDE 0.9 %
1000 INTRAVENOUS SOLUTION INTRAVENOUS ONCE
Status: COMPLETED | OUTPATIENT
Start: 2022-01-14 | End: 2022-01-14

## 2022-01-14 RX ORDER — ONDANSETRON 4 MG/1
4 TABLET, ORALLY DISINTEGRATING ORAL 3 TIMES DAILY PRN
Qty: 21 TABLET | Refills: 0 | Status: SHIPPED | OUTPATIENT
Start: 2022-01-14

## 2022-01-14 RX ORDER — ONDANSETRON 2 MG/ML
4 INJECTION INTRAMUSCULAR; INTRAVENOUS ONCE
Status: COMPLETED | OUTPATIENT
Start: 2022-01-14 | End: 2022-01-14

## 2022-01-14 RX ORDER — SODIUM CHLORIDE 0.9 % (FLUSH) 0.9 %
10 SYRINGE (ML) INJECTION PRN
Status: DISCONTINUED | OUTPATIENT
Start: 2022-01-14 | End: 2022-01-14 | Stop reason: HOSPADM

## 2022-01-14 RX ADMIN — ONDANSETRON 4 MG: 2 INJECTION INTRAMUSCULAR; INTRAVENOUS at 13:49

## 2022-01-14 RX ADMIN — SODIUM CHLORIDE 1000 ML: 9 INJECTION, SOLUTION INTRAVENOUS at 13:48

## 2022-01-14 RX ADMIN — SODIUM CHLORIDE 80 ML: 9 INJECTION, SOLUTION INTRAVENOUS at 14:11

## 2022-01-14 RX ADMIN — IOPAMIDOL 75 ML: 755 INJECTION, SOLUTION INTRAVENOUS at 14:11

## 2022-01-14 RX ADMIN — SODIUM CHLORIDE, PRESERVATIVE FREE 10 ML: 5 INJECTION INTRAVENOUS at 14:11

## 2022-01-14 ASSESSMENT — PAIN DESCRIPTION - PAIN TYPE
TYPE: ACUTE PAIN
TYPE: ACUTE PAIN

## 2022-01-14 ASSESSMENT — PAIN DESCRIPTION - DESCRIPTORS: DESCRIPTORS: BURNING;SHARP;DULL

## 2022-01-14 ASSESSMENT — PAIN SCALES - GENERAL
PAINLEVEL_OUTOF10: 6
PAINLEVEL_OUTOF10: 3

## 2022-01-14 ASSESSMENT — PAIN DESCRIPTION - FREQUENCY: FREQUENCY: INTERMITTENT

## 2022-01-14 ASSESSMENT — PAIN DESCRIPTION - LOCATION
LOCATION: ABDOMEN
LOCATION: ABDOMEN

## 2022-01-14 ASSESSMENT — PAIN DESCRIPTION - ORIENTATION: ORIENTATION: RIGHT

## 2022-01-14 NOTE — ED PROVIDER NOTES
[id number] [facility] ED  [address] [facility] [address] [place] [address]  Phone: [phone]  Fax: [phone]        Pt Name: Samara Lopez  MRN: [id number]  [place] 1980  Date of evaluation: 22      CHIEF COMPLAINT     Chief Complaint   Patient presents with    Abdominal Pain         HISTORY OF PRESENT ILLNESS  (Location/Symptom, Timing/Onset, Context/Setting, Quality, Duration, Modifying Factors, Severity.)    Samara Lopez is a 46 y.o. female who presents with right upper quadrant and epigastric pain. Patient had a laparoscopic cholecystectomy 2021. [facility].  She states over the past week she has been getting intermittent pain right upper quadrant and epigastric area. She states when she gets these attacks of pain it feels like when she was having a gallbladder attack. This is associated with some nausea and vomiting. She has had 2 or 3 episodes of vomiting over the last 2 days. She also has had some chills. Denies any fever. She denies any sore throat cough or congestion. Denies any chest pain or shortness of breath. This pain that she is getting in the right upper quadrant radiates to the right shoulder. She has had loose stools. She denies any dysuria or hematuria. She is currently just finishing her menses. REVIEW OF SYSTEMS    (2-9 systems for level 4, 10 or more for level 5)     Review of Systems this is reviewed and are negative except as presented in the HPI    [address]    has a past medical history of Anxiety and Anxiety. SURGICAL HISTORY      has a past surgical history that includes  section; Tubal ligation; Cholecystectomy, laparoscopic (2021); and Cholecystectomy, laparoscopic (N/A, 2021).     CURRENTMEDICATIONS       Previous Medications    BUSPIRONE (BUSPAR) 15 MG TABLET    Take 30 mg by mouth 2 times daily    BUTALBITAL-ACETAMINOPHEN-CAFFEINE-CODEINE (FIORICET WITH CODEINE) -94-30 MG PER CAPSULE    Take 1 capsule by mouth every 4 hours. CHOLECALCIFEROL (VITAMIN D3) 93842 UNITS CAPS    Take by mouth    DIAZEPAM (VALIUM) 5 MG TABLET    Take 5 mg by mouth every 6 hours as needed for Anxiety    ELASTIC BANDAGES & SUPPORTS (ABDOMINAL BINDER/ELASTIC MED) MISC    1 Units by Does not apply route as needed (post op pain)    ELASTIC BANDAGES & SUPPORTS (ABDOMINAL BINDER/ELASTIC MED) MISC    1 Units by Does not apply route as needed (post op pain)       ALLERGIES     has No Known Allergies. FAMILY HISTORY     has no family status information on file. family history is not on file. SOCIAL HISTORY      reports that she quit smoking about 2 years ago. Her smoking use included cigarettes. She has a 5.00 pack-year smoking history. She has never used smokeless tobacco. She reports current alcohol use. She reports that she does not use drugs. PHYSICAL EXAM    (up to 7 for level 4, 8 or more for level 5)   INITIAL VITALS:  height is 5' 2\" (1.575 m) and weight is 59 kg (130 lb). Her oral temperature is 98.1 °F (36.7 °C). Her blood pressure is 137/94 (abnormal) and her pulse is 101. Her respiration is 16 and oxygen saturation is 98%. Physical Exam  Constitutional:       General: She is in acute distress. Comments: Patient is in mild distress secondary to the epigastric and right upper quadrant abdominal pain and nausea. HENT:      Head: Normocephalic and atraumatic. Eyes:      Extraocular Movements: Extraocular movements intact. Pupils: Pupils are equal, round, and reactive to light. Cardiovascular:      Rate and Rhythm: Normal rate and regular rhythm. Heart sounds: Normal heart sounds. Pulmonary:      Effort: Pulmonary effort is normal. No respiratory distress. Breath sounds: Normal breath sounds. No stridor. No wheezing, rhonchi or rales. Chest:      Chest wall: No tenderness. Abdominal:      General: Bowel sounds are decreased. There is distension.       Palpations: Abdomen is soft.      Tenderness: There is abdominal tenderness in the right upper quadrant and epigastric area. There is no right CVA tenderness, left CVA tenderness, guarding or rebound. Negative signs include Dickey's sign, Rovsing's sign, McBurney's sign, psoas sign and obturator sign. Comments: Laparoscopic incisions are healing well. There is no erythema redness or swelling   Skin:     General: Skin is warm and dry. Capillary Refill: Capillary refill takes less than 2 seconds. Findings: No rash. Comments: Laparoscopic incisions have healed well. No erythema swelling or drainage   Neurological:      General: No focal deficit present. Mental Status: She is alert and oriented to person, place, and time. GCS: GCS eye subscore is 4. GCS verbal subscore is 5. GCS motor subscore is 6. Sensory: Sensation is intact. Motor: Motor function is intact. Gait: Gait is intact. Psychiatric:         Mood and Affect: Mood normal.         Behavior: Behavior normal.         DIFFERENTIAL DIAGNOSIS/ MDM:     Status post cholecystectomy. CT of abdomen possible postop abscess. Possible ileus versus small bowel obstruction. With pain going up into the shoulder will rule out coronary artery disease. PLEASE RETURN TO THE EMERGENCY DEPARTMENT IMMEDIATELY if your symptoms worsen in anyway or in 8-12 hours if not improved for re-evaluation. You should immediately return to the ER for symptoms such as increasing pain, bloody stool, fever, a feeling of passing out, light headed, dizziness, chest pain, shortness of breath, persistent nausea and/or vomiting, numbness or weakness to the arms or legs, coolness or color change of the arms or legs. Take your medication as indicated and prescribed. If you are given an antibiotic then, make sure you get the prescription filled and take the antibiotics until finished.       Please understand that at this time there is no evidence for a more serious underlying process, but that early in the process of an illness or injury, an emergency department workup can be falsely reassuring. You should contact your family doctor within the next 24 hours for a follow up appointment    Tamkunalramy Goodwin!!!    From [facility] ([facility]) and [facility]    On behalf of the Emergency Department staff at [facility]), I would like to thank you for giving us the opportunity to address your health care needs and concerns. We hope that during your visit, our service was delivered in a professional and caring manner. Please keep [facility]) in mind as we walk with you down the path to your own personal wellness. Please expect an automated text message or email from us so we can ask a few questions about your health and progress. Based on your answers, a clinician may call you back to offer help and instructions. Please understand that early in the process of an illness or injury, an emergency department workup can be falsely reassuring. If you notice any worsening, changing or persistent symptoms please call your family doctor or return to the ER immediately. Tell us how we did during your visit at [email or web address]   and let us know about your experience  DIAGNOSTIC RESULTS     EKG: All EKG's are interpreted by the Emergency Department Physician who either signs or Co-signs this chart in the absence of a cardiologist.      Interpreted by Steff Caba DO     Rhythm: normal sinus   Rate: normal  Axis: normal  Ectopy: none  Conduction: normal  ST Segments: no acute change  T Waves: no acute change  Q Waves: none    Clinical Impression: normal sinus rhythm with no acute changes/normal EKG. No acute infarction/ischemia noted.       RADIOLOGY:        Interpretation per the Radiologist below, if available at the time of this note:      CT ABDOMEN PELVIS W IV CONTRAST Additional Contrast? None    Result Date: 1/14/2022  EXAMINATION: CT OF THE ABDOMEN orders placed or performed during the hospital encounter of 01/14/22   CBC Auto Differential   Result Value Ref Range    WBC 6.7 3.5 - 11.0 k/uL    RBC 3.53 (L) 4.0 - 5.2 m/uL    Hemoglobin 12.1 12.0 - 16.0 g/dL    Hematocrit 34.7 (L) 36 - 46 %    MCV 98.3 80 - 100 fL    MCH 34.3 (H) 26 - 34 pg    MCHC 34.8 31 - 37 g/dL    RDW 14.1 12.5 - 15.4 %    Platelets 823 216 - 733 k/uL    MPV 8.2 6.0 - 12.0 fL    NRBC Automated NOT REPORTED per 100 WBC    Differential Type NOT REPORTED     Seg Neutrophils 64 36 - 66 %    Lymphocytes 26 24 - 44 %    Monocytes 8 2 - 11 %    Eosinophils % 1 1 - 4 %    Basophils 1 0 - 2 %    Immature Granulocytes NOT REPORTED 0 %    Segs Absolute 4.30 1.8 - 7.7 k/uL    Absolute Lymph # 1.70 1.0 - 4.8 k/uL    Absolute Mono # 0.50 0.1 - 1.2 k/uL    Absolute Eos # 0.10 0.0 - 0.4 k/uL    Basophils Absolute 0.10 0.0 - 0.2 k/uL    Absolute Immature Granulocyte NOT REPORTED 0.00 - 0.30 k/uL    WBC Morphology NOT REPORTED     RBC Morphology NOT REPORTED     Platelet Estimate NOT REPORTED    Comprehensive Metabolic Panel w/ Reflex to MG   Result Value Ref Range    Glucose 103 (H) 70 - 99 mg/dL    BUN 8 6 - 20 mg/dL    CREATININE 0.58 0.50 - 0.90 mg/dL    Bun/Cre Ratio NOT REPORTED 9 - 20    Calcium 9.0 8.6 - 10.4 mg/dL    Sodium 141 135 - 144 mmol/L    Potassium 3.3 (L) 3.7 - 5.3 mmol/L    Chloride 105 98 - 107 mmol/L    CO2 24 20 - 31 mmol/L    Anion Gap 12 9 - 17 mmol/L    Alkaline Phosphatase 72 35 - 104 U/L    ALT 34 (H) 5 - 33 U/L    AST 35 (H) <32 U/L    Total Bilirubin 0.20 (L) 0.3 - 1.2 mg/dL    Total Protein 6.7 6.4 - 8.3 g/dL    Albumin 4.1 3.5 - 5.2 g/dL    Albumin/Globulin Ratio 1.6 1.0 - 2.5    GFR Non-African American >60 >60 mL/min    GFR African American >60 >60 mL/min    GFR Comment          GFR Staging NOT REPORTED    Lipase   Result Value Ref Range    Lipase 21 13 - 60 U/L   Pregnancy, Urine   Result Value Ref Range    HCG(Urine) Pregnancy Test NEGATIVE NEGATIVE   Urinalysis, reflex to the patient in his office on Thursday, 1/20/2022. PROCEDURES:  None    FINAL IMPRESSION      1. Right upper quadrant abdominal pain          DISPOSITION/PLAN   DISPOSITION        CONDITION ON DISPOSITION:   Stable    PATIENT REFERRED TO:  Shelby Morgan IV,   [address]  [phone]    Call in 3 days        DISCHARGE MEDICATIONS:  New Prescriptions    HYDROCODONE-ACETAMINOPHEN (NORCO) 5-325 MG PER TABLET    Take 1 tablet by mouth every 6 hours as needed for Pain for up to 3 days. Intended supply: 3 days. Take lowest dose possible to manage pain    ONDANSETRON (ZOFRAN-ODT) 4 MG DISINTEGRATING TABLET    Take 1 tablet by mouth 3 times daily as needed for Nausea or Vomiting       (Please note that portions of this note were completed with a voicerecognition program.  Efforts were made to edit the dictations but occasionally words are mis-transcribed. )    Ronn Laura DO, , MD, F.A.C.E.P.   Attending Emergency Medicine Physician        Kim Coffey, [place]  01/14/22 [id number]

## 2022-01-15 LAB
EKG ATRIAL RATE: 82 BPM
EKG P AXIS: 25 DEGREES
EKG P-R INTERVAL: 150 MS
EKG Q-T INTERVAL: 398 MS
EKG QRS DURATION: 88 MS
EKG QTC CALCULATION (BAZETT): 464 MS
EKG R AXIS: 48 DEGREES
EKG T AXIS: 24 DEGREES
EKG VENTRICULAR RATE: 82 BPM

## 2022-01-20 ENCOUNTER — TELEPHONE (OUTPATIENT)
Dept: GASTROENTEROLOGY | Age: 42
End: 2022-01-20

## 2022-01-20 NOTE — TELEPHONE ENCOUNTER
NP/ New Referral, R10.11, G89.29 (ICD-10-CM) - Chronic right upper quadrant pain R93.2 (ICD-10-CM) - Abnormal CT of liver, [employer].

## 2022-03-02 ENCOUNTER — APPOINTMENT (OUTPATIENT)
Dept: GENERAL RADIOLOGY | Age: 42
End: 2022-03-02
Payer: MEDICARE

## 2022-03-02 ENCOUNTER — HOSPITAL ENCOUNTER (EMERGENCY)
Age: 42
Discharge: HOME | End: 2022-03-02
Attending: EMERGENCY MEDICINE
Payer: MEDICARE

## 2022-03-02 ENCOUNTER — OFFICE VISIT (OUTPATIENT)
Dept: GASTROENTEROLOGY | Age: 42
End: 2022-03-02
Payer: MEDICARE

## 2022-03-02 VITALS
WEIGHT: 113 LBS | RESPIRATION RATE: 14 BRPM | TEMPERATURE: 98.1 F | OXYGEN SATURATION: 99 % | DIASTOLIC BLOOD PRESSURE: 109 MMHG | HEIGHT: 62 IN | SYSTOLIC BLOOD PRESSURE: 153 MMHG | HEART RATE: 99 BPM | BODY MASS INDEX: 20.8 KG/M2

## 2022-03-02 VITALS — WEIGHT: 131 LBS | DIASTOLIC BLOOD PRESSURE: 99 MMHG | SYSTOLIC BLOOD PRESSURE: 142 MMHG | BODY MASS INDEX: 23.96 KG/M2

## 2022-03-02 DIAGNOSIS — Z51.89 ENCOUNTER FOR WOUND RE-CHECK: ICD-10-CM

## 2022-03-02 DIAGNOSIS — S80.12XD CONTUSION OF LEFT LOWER EXTREMITY, SUBSEQUENT ENCOUNTER: Primary | ICD-10-CM

## 2022-03-02 DIAGNOSIS — R10.9 ABDOMINAL PAIN, UNSPECIFIED ABDOMINAL LOCATION: Primary | ICD-10-CM

## 2022-03-02 PROCEDURE — G8427 DOCREV CUR MEDS BY ELIG CLIN: HCPCS | Performed by: INTERNAL MEDICINE

## 2022-03-02 PROCEDURE — G8420 CALC BMI NORM PARAMETERS: HCPCS | Performed by: INTERNAL MEDICINE

## 2022-03-02 PROCEDURE — 1036F TOBACCO NON-USER: CPT | Performed by: INTERNAL MEDICINE

## 2022-03-02 PROCEDURE — G8484 FLU IMMUNIZE NO ADMIN: HCPCS | Performed by: INTERNAL MEDICINE

## 2022-03-02 PROCEDURE — 73630 X-RAY EXAM OF FOOT: CPT

## 2022-03-02 PROCEDURE — 99283 EMERGENCY DEPT VISIT LOW MDM: CPT

## 2022-03-02 PROCEDURE — 99203 OFFICE O/P NEW LOW 30 MIN: CPT | Performed by: INTERNAL MEDICINE

## 2022-03-02 RX ORDER — PANTOPRAZOLE SODIUM 40 MG/1
40 TABLET, DELAYED RELEASE ORAL
Qty: 30 TABLET | Refills: 2 | Status: SHIPPED | OUTPATIENT
Start: 2022-03-02 | End: 2022-06-07

## 2022-03-02 ASSESSMENT — ENCOUNTER SYMPTOMS
NAUSEA: 1
VOMITING: 1
RESPIRATORY NEGATIVE: 1
ABDOMINAL DISTENTION: 1
CONSTIPATION: 1
ABDOMINAL PAIN: 1
DIARRHEA: 1
ALLERGIC/IMMUNOLOGIC NEGATIVE: 1

## 2022-03-02 ASSESSMENT — PAIN SCALES - GENERAL: PAINLEVEL_OUTOF10: 7

## 2022-03-02 ASSESSMENT — PAIN - FUNCTIONAL ASSESSMENT: PAIN_FUNCTIONAL_ASSESSMENT: 0-10

## 2022-03-02 NOTE — ED PROVIDER NOTES
[address] [facility] ED  EMERGENCY DEPARTMENT ENCOUNTER      Pt Name: Royanne Soulier  MRN: [id number]  [place] 1980  Date of evaluation: 3/2/2022  Provider: Ann Garrison MD    CHIEF COMPLAINT     Chief Complaint   Patient presents with    Leg Injury     1 week PTA- evaluated at [address] [place] [address]   (Location/Symptom, Timing/Onset, Context/Setting,Quality, Duration, Modifying Factors, Severity)  Note limiting factors. Royanne Soulier is a 46 y.o. female who presents to the emergency department stating she fell down a flight of steps 7 days ago. She sustained injuries to the left lower leg where she had a laceration over the lower leg. She was taken to [facility].  Patient states that she had been drinking at the time and had fairly extensive imaging. She was not found to have any intracranial injury and did not have any broken bones. She required suture repair of her laceration in the left lower leg. She comes in today because she has noticed worsening bruising in the left leg especially by the ankle and foot. She denies any other problems. The history is provided by the patient and medical records. Nursing Notes werereviewed. REVIEW OF SYSTEMS    (2-9 systems for level 4, 10 or more for level 5)     Review of Systems   All other systems reviewed and are negative. Except as noted above the remainder of the review of systems was reviewed and negative.        PAST MEDICAL HISTORY     Past Medical History:   Diagnosis Date    Anxiety     Anxiety          SURGICALHISTORY       Past Surgical History:   Procedure Laterality Date     SECTION      CHOLECYSTECTOMY, LAPAROSCOPIC  2021    robotic    CHOLECYSTECTOMY, LAPAROSCOPIC N/A 2021    CHOLECYSTECTOMY LAPAROSCOPIC ROBOTIC performed by Tamy Morgan IV, DO at [facility]       Discharge Medication List as of 3/2/2022 12:24 PM CONTINUE these medications which have NOT CHANGED    Details   ondansetron (ZOFRAN-ODT) 4 MG disintegrating tablet Take 1 tablet by mouth 3 times daily as needed for Nausea or Vomiting, Disp-21 tablet, R-0Print      !! Elastic Bandages & Supports (ABDOMINAL BINDER/ELASTIC MED) MISC PRN Starting 2021, Disp-1 each, R-0, Normal      !! Elastic Bandages & Supports (ABDOMINAL BINDER/ELASTIC MED) MISC PRN Starting 2021, Disp-1 each, R-0, Print      butalbital-acetaminophen-caffeine-codeine (FIORICET WITH CODEINE) -72-30 MG per capsule Take 1 capsule by mouth every 4 hours. Historical Med      Cholecalciferol (VITAMIN D3) 52794 units CAPS Take by mouthHistorical Med      diazepam (VALIUM) 5 MG tablet Take 5 mg by mouth every 6 hours as needed for Anxiety      busPIRone (BUSPAR) 15 MG tablet Take 30 mg by mouth 2 times daily       ! ! - Potential duplicate medications found. Please discuss with provider. ALLERGIES     Patient has no known allergies. FAMILY HISTORY     History reviewed. No pertinent family history.        SOCIAL HISTORY       Social History     Socioeconomic History    Marital status: Single     Spouse name: None    Number of children: None    Years of education: None    Highest education level: None   Occupational History    None   Tobacco Use    Smoking status: Former Smoker     Packs/day: 0.50     Years: 10.00     Pack years: 5.00     Types: Cigarettes     Quit date: 6/3/2019     Years since quittin.7    Smokeless tobacco: Never Used   Vaping Use    Vaping Use: Never used   Substance and Sexual Activity    Alcohol use: Yes     Comment: social    Drug use: No    Sexual activity: Yes     Partners: Male   Other Topics Concern    None   Social History Narrative    None     Social Determinants of Health     Financial Resource Strain:     Difficulty of Paying Living Expenses: Not on file   Food Insecurity:     Worried About Running Out of Food in the Last Year: Not on file    Ran Out of Food in the Last Year: Not on file   Transportation Needs:     Lack of Transportation (Medical): Not on file    Lack of Transportation (Non-Medical): Not on file   Physical Activity:     Days of Exercise per Week: Not on file    Minutes of Exercise per Session: Not on file   Stress:     Feeling of Stress : Not on file   Social Connections:     Frequency of Communication with Friends and Family: Not on file    Frequency of Social Gatherings with Friends and Family: Not on file    Attends [employer] Services: Not on file    Active Member of [address] [place] [address] [employer] or Organizations: Not on file    Attends Club or Organization Meetings: Not on file    Marital Status: Not on file   Intimate Partner Violence:     Fear of Current or Ex-Partner: Not on file    Emotionally Abused: Not on file    Physically Abused: Not on file    Sexually Abused: Not on file   Housing Stability:     Unable to Pay for Housing in the Last Year: Not on file    Number of [place] in the Last Year: Not on file    Unstable Housing in the Last Year: Not on file       SCREENINGS    [place] Coma Scale  Eye Opening: Spontaneous  Best Verbal Response: Oriented  Best Motor Response: Obeys commands  Rachna Coma Scale Score: 15        PHYSICAL EXAM    (up to 7 for level 4, 8 or more for level 5)     ED Triage Vitals [03/02/22 1117]   BP Temp Temp Source Pulse Resp SpO2 Height Weight   (!) 153/109 98.1 °F (36.7 °C) Oral 99 14 99 % 5' 2\" (1.575 m) 113 lb (51.3 kg)       Physical Exam  Vitals reviewed. Constitutional:       General: She is not in acute distress. Appearance: She is not ill-appearing. HENT:      Head: Normocephalic. Right Ear: External ear normal.      Left Ear: External ear normal.      Nose: Nose normal.   Eyes:      Extraocular Movements: Extraocular movements intact. Cardiovascular:      Rate and Rhythm: Normal rate. Rhythm irregular.    Pulmonary:      Effort: Pulmonary effort is normal. No respiratory distress. Abdominal:      Palpations: Abdomen is soft. Musculoskeletal:      Cervical back: Neck supple. Comments: Patient has extensive scattered bruising over the mid to distal left thigh, the lower leg extending into the ankle and foot. No signs of infection. Her laceration is in the shape of the letter V and is located over the anterior tibial border of the proximal mid left tibia. This is healing nicely and does not show any signs of infection. The left foot is puffy and she reports tingling in the toes but she has intact sensation and there is no evidence of neurovascular compromise. Skin:     General: Skin is warm and dry. Coloration: Skin is not pale. Neurological:      General: No focal deficit present. Mental Status: She is alert and oriented to person, place, and time. DIAGNOSTIC RESULTS     EKG: All EKG's are interpreted by the Emergency Department Physician who either signs orCo-signs this chart in the absence of a cardiologist.    RADIOLOGY:     Interpretation per the Radiologist below, ifavailable at the time of this note:    XR FOOT LEFT (MIN 3 VIEWS)   Final Result   1. Mild soft tissue edema of the left foot. 2. No acute fracture or dislocation. ED BEDSIDE ULTRASOUND:   Performed by ED Physician - none    LABS:  Labs Reviewed - No data to display    All other labs were within normal range ornot returned as of this dictation. EMERGENCY DEPARTMENT COURSE and DIFFERENTIAL DIAGNOSIS/MDM:   Vitals:    Vitals:    03/02/22 1117   BP: (!) 153/109   Pulse: 99   Resp: 14   Temp: 98.1 °F (36.7 °C)   TempSrc: Oral   SpO2: 99%   Weight: 51.3 kg (113 lb)   Height: 5' 2\" (1.575 m)            Patient's previous ER record at the outlying facility was reviewed. She has had fairly extensive imaging studies done. The only thing I do not see is x-rays of the left foot which I have obtained today and which are negative.   I have explained to the patient this is a natural consequence of soft tissue injury and the discoloration will following the path of gravity, migrate distally until complete resolution in several weeks. She is discharged in stable condition and is advised to have her sutures removed in the next 4 to 5 days. MDM     CONSULTS:  None    PROCEDURES:  Unlessotherwise noted below, none     Procedures    FINAL IMPRESSION      1. Contusion of left lower extremity, subsequent encounter    2. Encounter for wound re-check          DISPOSITION/PLAN   DISPOSITION Decision To Discharge 03/02/2022 12:22:29 PM      PATIENT REFERRED TO:  Quita Rodriguez  [address] [place] [address]  [address] [place] [id number]  [phone]            DISCHARGE MEDICATIONS:         Problem List:  There is no problem list on file for this patient. Summation      Patient Course: Discharged    ED Medicationsadministered this visit:  Medications - No data to display    New Prescriptions from this visit:    Discharge Medication List as of 3/2/2022 12:24 PM          Follow-up:  Quita Rodriguez  [address] [place] [address]  [address] [place] [address] [address]  [phone]              Final Impression:   1. Contusion of left lower extremity, subsequent encounter    2.  Encounter for wound re-check               (Please note that portions of this note were completed with a voice recognitionprogram.  Efforts were made to edit the dictations but occasionally words are mis-transcribed.)    Homero Beltran MD (electronically signed)  Attending Emergency Physician            Homero Beltran MD  03/02/22 [phone]

## 2022-03-02 NOTE — PROGRESS NOTES
Reason for Referral: Dyspepsia      [facility]  [facility],  [address] [place] [address]    Chief Complaint   Patient presents with    New Patient     Abd pain, abnormal liver CT    Abdominal Pain     pt states she has had abd pain for a long time, had gallbladder taken out around dec and is still having pain            HISTORY OF PRESENT ILLNESS: Ms.Nichole Berenice Vences is a 46 y.o. female with a past history remarkable for subacute and chronic postprandial dyspepsia and abdominal pain. Abdominal pain appears to be localized to the epigastric region but occasionally migrates to the right upper quadrant left upper quadrant. Associated nausea and emesis in the past.  Currently taking Zofran with modest relief. Patient currently works as a , drinks alcohol nightly, avid cannabis user and smoker of cigarettes. Patient denies any secondary weight loss, denies any significant lower GI symptoms. No recent endoscopic evaluation. Not on PPI therapy. Smoke: 1-2 day,   Drinking history: Daily, short fireballs   Illicit drugs: Cannibus, edibles, CBD   Abdominal surgeries: Tubal ligation,  x 2, CCY,   Prior Colonoscopy: None   Prior EGD: None   FH of GI issues: None      Past Medical,Family, and Social History reviewed and does contribute to the patient presentingcondition. Patient's PMH/PSH,SH,PSYCH Hx, MEDs, ALLERGIES, and ROS were all reviewed and updated in the appropriate sections.     PAST MEDICAL HISTORY:  Past Medical History:   Diagnosis Date    Anxiety     Anxiety        Past Surgical History:   Procedure Laterality Date     SECTION      CHOLECYSTECTOMY, LAPAROSCOPIC  2021    robotic    CHOLECYSTECTOMY, LAPAROSCOPIC N/A 2021    CHOLECYSTECTOMY LAPAROSCOPIC ROBOTIC performed by Lilli Morgan IV, DO at [address]:    Current Outpatient Medications:     ondansetron (ZOFRAN-ODT) 4 MG disintegrating tablet, Take 1 tablet by mouth 3 times daily as needed for Nausea or Vomiting, Disp: 21 tablet, Rfl: 0    Elastic Bandages & Supports (ABDOMINAL BINDER/ELASTIC MED) MISC, 1 Units by Does not apply route as needed (post op pain), Disp: 1 each, Rfl: 0    Elastic Bandages & Supports (ABDOMINAL BINDER/ELASTIC MED) MISC, 1 Units by Does not apply route as needed (post op pain), Disp: 1 each, Rfl: 0    butalbital-acetaminophen-caffeine-codeine (FIORICET WITH CODEINE) -70-30 MG per capsule, Take 1 capsule by mouth every 4 hours. , Disp: , Rfl:     Cholecalciferol (VITAMIN D3) 84893 units CAPS, Take by mouth, Disp: , Rfl:     diazepam (VALIUM) 5 MG tablet, Take 5 mg by mouth every 6 hours as needed for Anxiety, Disp: , Rfl:     busPIRone (BUSPAR) 15 MG tablet, Take 30 mg by mouth 2 times daily, Disp: , Rfl:     ALLERGIES:   No Known Allergies    FAMILY HISTORY: History reviewed. No pertinent family history.       SOCIAL HISTORY:   Social History     Socioeconomic History    Marital status: Single     Spouse name: Not on file    Number of children: Not on file    Years of education: Not on file    Highest education level: Not on file   Occupational History    Not on file   Tobacco Use    Smoking status: Former Smoker     Packs/day: 0.50     Years: 10.00     Pack years: 5.00     Types: Cigarettes     Quit date: 6/3/2019     Years since quittin.7    Smokeless tobacco: Never Used   Vaping Use    Vaping Use: Never used   Substance and Sexual Activity    Alcohol use: Yes     Comment: social    Drug use: No    Sexual activity: Yes     Partners: Male   Other Topics Concern    Not on file   Social History Narrative    Not on file     Social Determinants of Health     Financial Resource Strain:     Difficulty of Paying Living Expenses: Not on file   Food Insecurity:     Worried About Running Out of Food in the Last Year: Not on file    Amena of Food in the Last Year: Not on file Transportation Needs:     Lack of Transportation (Medical): Not on file    Lack of Transportation (Non-Medical): Not on file   Physical Activity:     Days of Exercise per Week: Not on file    Minutes of Exercise per Session: Not on file   Stress:     Feeling of Stress : Not on file   Social Connections:     Frequency of Communication with Friends and Family: Not on file    Frequency of Social Gatherings with Friends and Family: Not on file    Attends Synagogue Services: Not on file    Active Member of [address] [place] [address] or Organizations: Not on file    Attends Club or Organization Meetings: Not on file    Marital Status: Not on file   Intimate Partner Violence:     Fear of Current or Ex-Partner: Not on file    Emotionally Abused: Not on file    Physically Abused: Not on file    Sexually Abused: Not on file   Housing Stability:     Unable to Pay for Housing in the Last Year: Not on file    Number of [place] in the Last Year: Not on file    Unstable Housing in the Last Year: Not on file         REVIEW OF SYSTEMS: A 12-point review of systems was obtained and pertinent positives and negatives were listed below. REVIEW OF SYSTEMS:     Constitutional: No fever, no chills, no lethargy, no weakness. HEENT:  No headache, otalgia, itchy eyes, nasal discharge or sore throat. Cardiac:  No chest pain, dyspnea, orthopnea or PND. Chest:   No cough, phlegm or wheezing. Abdomen:      Detailed by MA   Neuro:  No focal weakness, abnormal movements or seizure like activity. Skin:   No rashes, no itching. :   No hematuria, no pyuria, no dysuria, no flank pain. Extremities:  No swelling or joint pains. ROS was otherwise negative    Review of Systems   Constitutional: Positive for fatigue. HENT: Negative. Eyes: Positive for visual disturbance. Respiratory: Negative. Cardiovascular: Negative.     Gastrointestinal: Positive for abdominal distention, abdominal pain, constipation, diarrhea, nausea and vomiting. Endocrine: Negative. Musculoskeletal: Negative. Allergic/Immunologic: Negative. Neurological: Positive for dizziness, light-headedness and headaches. Hematological: Bruises/bleeds easily. Psychiatric/Behavioral: Negative. PHYSICAL EXAMINATION: Vital signs reviewed per the nursing documentation. BP (!) 142/99   Wt 131 lb (59.4 kg)   LMP 02/02/2022   BMI 23.96 kg/m²   Body mass index is 23.96 kg/m². Physical Exam    Physical Exam   Constitutional: Patient is oriented to person, place, and time. Patient appears well-developed and well-nourished. HENT:   Head: Normocephalic and atraumatic. Eyes: Pupils are equal, round, and reactive to light. EOM are normal.   Neck: Normal range of motion. Neck supple. No JVD present. No tracheal deviation present. No thyromegaly present. Cardiovascular: Normal rate, regular rhythm, normal heart sounds and intact distal pulses. Pulmonary/Chest: Effort normal and breath sounds normal. No stridor. No respiratory distress. He has no wheezes. He has no rales. He exhibits no tenderness. Abdominal: Soft. Bowel sounds are normal. He exhibits no distension and no mass. There is no tenderness. There is no rebound and no guarding. No hernia. Musculoskeletal: Normal range of motion. Lymphadenopathy:    Patient has no cervical adenopathy. Neurological: Patient is alert and oriented to person, place, and time. Psychiatric: Patient has a normal mood and affect.  Patient behavior is normal.       LABORATORY DATA: Reviewed  Lab Results   Component Value Date    WBC 6.7 01/14/2022    HGB 12.1 01/14/2022    HCT 34.7 (L) 01/14/2022    MCV 98.3 01/14/2022     01/14/2022     01/14/2022    K 3.3 (L) 01/14/2022     01/14/2022    CO2 24 01/14/2022    BUN 8 01/14/2022    CREATININE 0.58 01/14/2022    LABALBU 4.1 01/14/2022    BILITOT 0.20 (L) 01/14/2022    ALKPHOS 72 01/14/2022    AST 35 (H) 01/14/2022    ALT 34 (H) 01/14/2022 Lab Results   Component Value Date    RBC 3.53 (L) 01/14/2022    HGB 12.1 01/14/2022    MCV 98.3 01/14/2022    MCH 34.3 (H) 01/14/2022    MCHC 34.8 01/14/2022    RDW 14.1 01/14/2022    MPV 8.2 01/14/2022    BASOPCT 1 01/14/2022    LYMPHSABS 1.70 01/14/2022    MONOSABS 0.50 01/14/2022    NEUTROABS 4.30 01/14/2022    EOSABS 0.10 01/14/2022    BASOSABS 0.10 01/14/2022         DIAGNOSTIC TESTING:     XR FOOT LEFT (MIN 3 VIEWS)    Result Date: 3/2/2022  EXAMINATION: THREE XRAY VIEWS OF THE LEFT FOOT 3/2/2022 11:34 am COMPARISON: 08/02/2017 HISTORY: ORDERING SYSTEM PROVIDED HISTORY: bruising, swelling TECHNOLOGIST PROVIDED HISTORY: bruising, swelling Reason for Exam: left foot pain, bruising, swelling Additional signs and symptoms: swelling and brusing greatly increased from toes to mid ankle. Relevant Medical/Surgical History: fell down steps 1 week ago. Sts, initially evaulated at that time. 77-year-old female with left foot pain, bruising and swelling FINDINGS: Osseous alignment is normal.  Joint spaces are well maintained. No marginal erosions are identified. No acute fracture or gross dislocation is seen. The medial and middle cuneiforms demonstrate proper alignment with the base of the 1st and 2nd metatarsals respectively. No tibiotalar joint effusion is seen. Boehler's angle is maintained. Mild soft tissue edema of the left foot. 1. Mild soft tissue edema of the left foot. 2. No acute fracture or dislocation. IMPRESSION: Viviana Collazo is a 46 y.o. female with a past history remarkable for subacute and chronic postprandial dyspepsia and abdominal pain. Abdominal pain appears to be localized to the epigastric region but occasionally migrates to the right upper quadrant left upper quadrant. Associated nausea and emesis in the past.  Currently taking Zofran with modest relief. Patient currently works as a , drinks alcohol nightly, avid cannabis user and smoker of cigarettes. Patient denies any secondary weight loss, denies any significant lower GI symptoms. No recent endoscopic evaluation. Not on PPI therapy. Assessment  1. Abdominal pain, unspecified abdominal location        PLAN:    1) dyspepsia-we will send for H. pylori breath testing prior to starting PPI therapy, if positive will provide antibiotic course to treat accordingly. Send for celiac disease testing. After protecting the patient to be started on Protonix 40 mg daily. If no resolve or no significant clinical provement may consider endoscopic evaluation to further evaluate for the possibility of peptic ulcer disease. Patient advised to avoid NSAIDs, avoid alcohol, and avoid smoking. 2) follow-up in 2- 3 months. Thank you for allowing me to participate in the care of Ms. White. For any further questions please do not hesitate to contact me. I have reviewed and agree with the MA/LPN ROS please refer to their documentation from today's encounter on a separate note. Kelsi Hernandez MD, MPH   [facility]  Office #: [phone]          this note is created with the assistance of a speech recognition program.  While intending to generate a document that actually reflects the content of the visit, the document can still have some errors including those of syntax and sound a like substitutions which may escape proof reading. It such instances, actual meaning can be extrapolated by contextual diversion.

## 2022-03-02 NOTE — ED NOTES
Pedal pulses assessed using doppler on left- palpated on right     Stuart Eddy RN  03/02/22 54 [facility], RN  03/02/22 [id number]

## 2022-03-02 NOTE — ED NOTES
Pt ambulates to room with limp. Pt states 1 week PTA she was intoxicated- fell down unknown number of stairs- injuring left leg. Pt taken to [facility] ED for evaluation. Pt reports negative imaging. Pt has lac to left shin with sutures in place. Ecchymosis noted on left leg from mid thigh to toes. Edema noted as well. Pt states she took valium/tylenol/motrin at 0800 with little relief. Pt AOx4. RR easy, unlabored. Left leg elevated using pillows.      Magy Martel RN  03/02/22 [id number]

## 2022-03-02 NOTE — ED NOTES
Pt declined offer of crutches- also states she does not need work note     Ankush Ramesh, RN  03/02/22 [id number]

## 2022-03-19 ENCOUNTER — APPOINTMENT (OUTPATIENT)
Dept: CT IMAGING | Age: 42
End: 2022-03-19
Payer: MEDICARE

## 2022-03-19 ENCOUNTER — HOSPITAL ENCOUNTER (EMERGENCY)
Age: 42
Discharge: HOME | End: 2022-03-19
Attending: EMERGENCY MEDICINE
Payer: MEDICARE

## 2022-03-19 VITALS
RESPIRATION RATE: 16 BRPM | HEART RATE: 103 BPM | TEMPERATURE: 98.5 F | DIASTOLIC BLOOD PRESSURE: 92 MMHG | HEIGHT: 62 IN | BODY MASS INDEX: 22.08 KG/M2 | WEIGHT: 120 LBS | SYSTOLIC BLOOD PRESSURE: 131 MMHG | OXYGEN SATURATION: 99 %

## 2022-03-19 DIAGNOSIS — S80.12XA LEG HEMATOMA, LEFT, INITIAL ENCOUNTER: ICD-10-CM

## 2022-03-19 DIAGNOSIS — S81.812A LACERATION OF LEFT LOWER EXTREMITY, INITIAL ENCOUNTER: Primary | ICD-10-CM

## 2022-03-19 PROCEDURE — 2500000003 HC RX 250 WO HCPCS: Performed by: PHYSICIAN ASSISTANT

## 2022-03-19 PROCEDURE — 73700 CT LOWER EXTREMITY W/O DYE: CPT

## 2022-03-19 PROCEDURE — 10140 I&D HMTMA SEROMA/FLUID COLLJ: CPT

## 2022-03-19 PROCEDURE — 99283 EMERGENCY DEPT VISIT LOW MDM: CPT

## 2022-03-19 RX ORDER — BUPIVACAINE HYDROCHLORIDE 5 MG/ML
5 INJECTION, SOLUTION PERINEURAL ONCE
Status: COMPLETED | OUTPATIENT
Start: 2022-03-19 | End: 2022-03-19

## 2022-03-19 RX ORDER — DOXYCYCLINE HYCLATE 100 MG
100 TABLET ORAL 2 TIMES DAILY
Qty: 14 TABLET | Refills: 0 | Status: SHIPPED | OUTPATIENT
Start: 2022-03-19 | End: 2022-03-26

## 2022-03-19 RX ADMIN — BUPIVACAINE HYDROCHLORIDE 25 MG: 5 INJECTION, SOLUTION PERINEURAL at 17:15

## 2022-03-19 ASSESSMENT — PAIN SCALES - GENERAL: PAINLEVEL_OUTOF10: 3

## 2022-03-19 ASSESSMENT — PAIN - FUNCTIONAL ASSESSMENT: PAIN_FUNCTIONAL_ASSESSMENT: 0-10

## 2022-03-19 NOTE — ED PROVIDER NOTES
Attending Supervisory Note/Shared Visit   I have personally performed a face to face diagnostic evaluation on this patient. I have reviewed the mid-levels findings and agree.         (Please note that portions of this note were completed with a voice recognition program.  Efforts were made to edit the dictations but occasionally words are mis-transcribed.)    Xenia Oro MD  Attending Emergency Physician        Xenia Oro MD  03/19/22 0302

## 2022-03-20 NOTE — ED PROVIDER NOTES
[id number] [facility]  [address] [facility] [address] [facility] [address]  Phone: [phone]  Fax: [phone]        Pt Name: Gibson Mayorga  MRN: [id number]  [place] 1980  Date of evaluation: 3/19/22    CHIEFCOMPLAINT       Chief Complaint   Patient presents with    Cellulitis     left leg per patient, states fell down 3 weeks ago and got stiches and now believes she has cellulitis again       HISTORY OF PRESENT ILLNESS (Location/Symptom, Timing/Onset, Context/Setting, Quality, Duration, Modifying Factors, Severity)      Gibson Mayorga is a 38 y.o. female with no pertinent PMH who presents to the ED via private auto with a wound check and concerns for cellulitis. Patient reports that 3 weeks ago she and her partner found out that he had cancer and so they went out drinking and got very drunk. She fell down some steps consequently striking her left lower leg and causing a laceration and was seen in the emergency department. She had some stitches placed but has been dealing with some infections and then had the stitches removed and she has been seen at least 4 times prior to this visit regarding this injury in an emergency department or urgent care. She states that she has been experiencing increased pain to the area and she also has a large bump just above the cut which is very tender. Patient has been on antibiotics but just went off of them a few days ago. She has exacerbation of the pain when putting her pants on or if anything touches the area. She has exacerbation when she is walking. Denies any alleviating factors. Denies any fever, chills, nausea, vomiting, weakness, paresthesias, numbness, or any other concerns at this time. PAST MEDICAL / SURGICAL / SOCIAL / FAMILY HISTORY     PMH:  has a past medical history of Anxiety and Anxiety. Surgical History:  has a past surgical history that includes  section; Tubal ligation;  Cholecystectomy, laparoscopic (2021); and Cholecystectomy, laparoscopic (N/A, 12/13/2021). Social History:  reports that she quit smoking about 2 years ago. Her smoking use included cigarettes. She has a 5.00 pack-year smoking history. She has never used smokeless tobacco. She reports current alcohol use. She reports that she does not use drugs. Family History: has no family status information on file. family history is not on file. Psychiatric History: None    Allergies: Patient has no known allergies. Home Medications:   Prior to Admission medications    Medication Sig Start Date End Date Taking? Authorizing Provider   doxycycline hyclate (VIBRA-TABS) 100 MG tablet Take 1 tablet by mouth 2 times daily for 7 days 3/19/22 3/26/22 Yes Krystal Mcknight PA-C   pantoprazole (PROTONIX) 40 MG tablet Take 1 tablet by mouth every morning (before breakfast) 3/2/22   Chris Louis MD   ondansetron (ZOFRAN-ODT) 4 MG disintegrating tablet Take 1 tablet by mouth 3 times daily as needed for Nausea or Vomiting 1/14/22   Marisela Landers, DO   Elastic Bandages & Supports (ABDOMINAL BINDER/ELASTIC MED) MISC 1 Units by Does not apply route as needed (post op pain) 12/29/21   Lorene Arreola, DO   Elastic Bandages & Supports (ABDOMINAL BINDER/ELASTIC MED) MISC 1 Units by Does not apply route as needed (post op pain) 12/29/21   Kiah Zuniga, DO   butalbital-acetaminophen-caffeine-codeine (FIORICET WITH CODEINE) -58-30 MG per capsule Take 1 capsule by mouth every 4 hours. 11/14/18   Historical Provider, MD   Cholecalciferol (VITAMIN D3) 02205 units CAPS Take by mouth    Historical Provider, MD   diazepam (VALIUM) 5 MG tablet Take 5 mg by mouth every 6 hours as needed for Anxiety    Historical Provider, MD   busPIRone (BUSPAR) 15 MG tablet Take 30 mg by mouth 2 times daily    Historical Provider, MD       REVIEW OF SYSTEMS  (2-9 systems for level 4, 10 ormore for level 5)      Review of Systems    Constitutional: Denies fever or chills.    Eyes: Denies vision changes. HENT: Denies sore throat or neck pain. Respiratory: Denies cough or shortness of breath. Cardiovascular: Denies chest pain. GI: Denies vomiting or diarrhea. : Denies painful urination. Musculoskeletal: See HPI. Skin: See HPI. Neurologic:  Denies new numbness or weakness. Psychiatric: Denies agitation. Heme: Denies bleeding disorders. All other systems negative except as marked. PHYSICAL EXAM  (up to 7 for level 4, 8 or more for level 5)      INITIAL VITALS:  height is 5' 2\" (1.575 m) and weight is 54.4 kg (120 lb). Her oral temperature is 98.5 °F (36.9 °C). Her blood pressure is 131/92 (abnormal) and her pulse is 103. Her respiration is 16 and oxygen saturation is 99%. Vital signs reviewed. Physical Exam    General:  Alert, cooperative, well-groomed, well-nourished, appears stated age, and is in no acute distress. Head:  Normocephalic, atraumatic, and without obvious abnormality. Eyes:  Sclerae/conjunctivae clear without injection, pallor, or icterus. Corneas clear without opacities. EOM's intact. ENT: Ears and nose are all without obvious masses lesion or deformity. Neck: Supple and symmetrical. Trachea midline. No adenopathy. No jugular venous distention. Extremities: Warm and dry. There is a 4.2 cm V-shaped poorly healing laceration with very minimal erythema, approximately 0.5 cm surrounding the laceration noted to the mid left anterior tibia region with a 4 cm x 6 cm hematoma noted just superior and medial to that which is all diffusely tender to palpation. There is no active drainage from the wound though the incision does not appear dry but wet with freshly scabbed blood. Cap refill less than 2 seconds. DP pulses 2+. Joints are normal in appearance with full range of motion 5/5 strength. Skin: Soft, good turgor, and well-hydrated. No obvious rashes or lesions. Neurologic: GCS is 15 and no focal deficits are appreciated. Normal gait.  Grossly normal motor and sensation. Speech clear. Psychiatric: Normal mood and affect. Normal behavior. Coherent thought process. DIFFERENTIAL DIAGNOSIS / MDM     Patient presents to the emergency department the complaint as described above. Vital signs demonstrate some mild tachycardia, but otherwise unremarkable. Physical exam demonstrates well-appearing nontoxic female who is anxious and appears in pain, but otherwise in no acute distress. Examination of the lower leg reveals some very minimal amount of erythema surrounding a poorly healing V-shaped laceration to the mid to upper anterior shin as well as a 4 cm x 6 cm tender lump just superior to that there is no erythema or warmth to the lump. There is no drainage from the wound. DP pulses are 2+ and symmetrical.  Strength is 5/5 throughout all joints and she has full range of motion to all joints. She has had lab work done as well as x-rays done and everything has been normal.  I suspect the lump is likely a hematoma however I do want to obtain a CT scan to rule out foreign body as maybe she has a non-radiopaque foreign body that could not be visualized on CT which is because this persistent lump and infections. PLAN (LABS / IMAGING / EKG):  Orders Placed This Encounter   Procedures    CT TIBIA FIBULA LEFT WO CONTRAST       MEDICATIONS ORDERED:  Orders Placed This Encounter   Medications    doxycycline hyclate (VIBRA-TABS) 100 MG tablet     Sig: Take 1 tablet by mouth 2 times daily for 7 days     Dispense:  14 tablet     Refill:  0    bupivacaine (MARCAINE) 0.5 % injection 25 mg       Controlled Substances Monitoring:     DIAGNOSTIC RESULTS     RADIOLOGY: All images are read by the radiologist and their interpretations are reviewed.     XR FOOT LEFT (MIN 3 VIEWS)    Result Date: 3/2/2022  EXAMINATION: THREE XRAY VIEWS OF THE LEFT FOOT 3/2/2022 11:34 am COMPARISON: 08/02/2017 HISTORY: ORDERING SYSTEM PROVIDED HISTORY: bruising, swelling TECHNOLOGIST PROVIDED HISTORY: bruising, swelling Reason for Exam: left foot pain, bruising, swelling Additional signs and symptoms: swelling and brusing greatly increased from toes to mid ankle. Relevant Medical/Surgical History: fell down steps 1 week ago. Sts, initially evaulated at that time. 72-year-old female with left foot pain, bruising and swelling FINDINGS: Osseous alignment is normal.  Joint spaces are well maintained. No marginal erosions are identified. No acute fracture or gross dislocation is seen. The medial and middle cuneiforms demonstrate proper alignment with the base of the 1st and 2nd metatarsals respectively. No tibiotalar joint effusion is seen. Boehler's angle is maintained. Mild soft tissue edema of the left foot. 1. Mild soft tissue edema of the left foot. 2. No acute fracture or dislocation. CT TIBIA FIBULA LEFT WO CONTRAST    Result Date: 3/19/2022  EXAMINATION: CT OF THE LEFT TIBIA AND FIBULA WITHOUT CONTRAST 3/19/2022 3:45 pm TECHNIQUE: CT of the left tibia and fibula was performed without the administration of intravenous contrast.  Multiplanar reformatted images are provided for review. Dose modulation, iterative reconstruction, and/or weight based adjustment of the mA/kV was utilized to reduce the radiation dose to as low as reasonably achievable. COMPARISON: None. HISTORY ORDERING SYSTEM PROVIDED HISTORY: Right leg wound x1 month keep getting infected and has a large bump still - poss fluid collection vs non radiopaque FB TECHNOLOGIST PROVIDED HISTORY: Right leg wound x1 month keep getting infected and has a large bump still - poss fluid collection vs non radiopaque FB Decision Support Exception - unselect if not a suspected or confirmed emergency medical condition->Emergency Medical Condition (MA) Is the patient pregnant?->No Reason for Exam: left lower leg pain and swelling, wound x 1 month FINDINGS: Bones: No acute fracture identified.   No suspicious lytic or sclerotic osseous lesion. No osteolysis or suspicious periosteal reaction identified to suggest CT evidence of osteomyelitis. 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 Soft Tissue: Organized collection identified along the anteromedial soft tissues at the level of the proximal tibial diaphysis. The collection measures approximately 1.7 x 4.7 x 5.4 cm (image 59 series 5; image 46 series 400; image 30 series 401). Subtle hyperattenuation identified within the collection. This closely approximates the skin surface with question of small adjacent wound/ulceration. The sterility of this fluid is indeterminate on imaging. Differential considerations include hematoma versus abscess given the somewhat complex appearance. No subcutaneous gas identified. No gas within the fluid collection. Mild surrounding subcutaneous edema. No suspicious mass identified. No fluid identified within the deep fascial planes. Joint: Anatomic alignment of the imaged left knee and ankle. Joint spaces appear grossly preserved. 1. Complex organized collection along the anteromedial soft tissues at the level of the proximal tibial diaphysis. The collection measures 1.7 x 4.7 x 5.4 cm and closely abuts the skin surface with question of overlying wound/ulceration. Primary differential considerations include abscess or possible hematoma in the appropriate clinical setting given the complex appearance. Surrounding mild subcutaneous edema. 2. No acute osseous findings. LABS:  No results found for this visit on 03/19/22. EMERGENCY DEPARTMENT COURSE           Vitals:    Vitals:    03/19/22 1519   BP: (!) 131/92   Pulse: 103   Resp: 16   Temp: 98.5 °F (36.9 °C)   TempSrc: Oral   SpO2: 99%   Weight: 54.4 kg (120 lb)   Height: 5' 2\" (1.575 m)     -------------------------  BP: (!) 131/92, Temp: 98.5 °F (36.9 °C), Pulse: 103, Resp: 16      RE-EVALUATION:  See MDM and procedure notes.   CT scan demonstrated what I suspect is a hematoma and there does not appear to be any foreign bodies within the area. Patient was updated regarding results and she requested that we are during the hematoma as she really would like some pain relief as the wound still appears to be open, I will numb and drain hematoma through the wound as well as irrigated copiously. We did had a lengthy discussion regarding healing by secondary intention and she requested that we at least close at some so there is not a large gaping hole and we did have a lengthy discussion regarding concerns for infection and the importance of drainage and she verbalized understanding. Advised suture removal in 7-10 days. And ace wrap for compression of the expressed hematoma area and did give 1 more round of doxy to prevent infection. Discussed wound care as well. The patient and/or family and I have discussed the diagnosis and risks, and we agree with discharging home to follow-up with their pertinent providers. The patient appears stable for discharge and has been instructed to return immediately for new concerning symptoms or if the symptoms worsen in any way. The patient understands that at this time there is no evidence for a more malignant underlying process, but the patient also understands that early in the process of an illness or injury, an emergency department workup can be falsely reassuring. Routine discharge counseling was given, and the patient understands that worsening, changing or persistent symptoms should prompt an immediate call or follow up with their primary physician or return to the emergency department. I have reviewed the disposition diagnosis with the patient and or their family/guardian. I have answered their questions and given discharge instructions. They voiced understanding of these instructions and did not have any further questions or complaints.      This patient was seen by the attending physician and they agreed with the assessment and plan.    CONSULTS:  None    PROCEDURES:    Laceration Repair Procedure Note    Indication: Laceration / Hematoma Drainage    Procedure: The patient was placed in the appropriate position and anesthesia around the laceration was obtained by infiltration using 7.0 cc of 0.5% Bupivacaine without epinephrine. Using hemostats and pick-ups, the wound was re-opened and there was found to be 2 nonabsorbable stitches present. Both of which were removed. The hemostat were pushed into the hematome very superficially and then direct pressure was applied and ~200cc of grape jelly-like substances/blood was expressed through the open laceration. The area was then irrigated with 500 cc high pressure Shur-Clens and normal saline solution and explored with no foreign bodies discovered and no tendon injury noted as it was rather superficial. The laceration was loosely  closed with 5-0 Ethilon using interrupted sutures. There were no additional lacerations requiring repair. The wound area was then dressed with bacitracin, a sterile dressing and an ace wrap. Total repaired wound length: 4.2 cm. Other Items: Suture count: 3 (1 center of V and 1 on each side)    The patient tolerated the procedure well. Complications: None    Francie Newman PA-C  Emergency Medicine Physician Assistant    Authorized by: Dr. Delores Ferrera      1. Laceration of left lower extremity, initial encounter    2. Leg hematoma, left, initial encounter          DISPOSITION / PLAN     CONDITION ON DISPOSITION:   Good / Stable for discharge.      PATIENT REFERRED TO:  Jyothi Jennings  [address] [place] [address]   [id number]  [facility] [address] [phone]    Call in 2 days  To schedule an appointment for suture removal in 10-14 days      DISCHARGE MEDICATIONS:  Discharge Medication List as of 3/19/2022  5:09 PM      START taking these medications    Details   doxycycline hyclate (VIBRA-TABS) 100 MG tablet Take 1 tablet by mouth 2 times daily for 7 days, Disp-14 tablet, R-0Normal             Donnie Rodriguez PA-C   Emergency Medicine Physician Assistant    (Please note that portions of this note were completed with a voice recognition program.  Efforts were made to edit the dictations but occasionally words aremis-transcribed.)        Donnie Rodriguez PA-C  03/25/22 1864

## 2022-05-25 ENCOUNTER — HOSPITAL ENCOUNTER (EMERGENCY)
Age: 42
Discharge: HOME | End: 2022-05-25
Attending: EMERGENCY MEDICINE
Payer: MEDICARE

## 2022-05-25 ENCOUNTER — APPOINTMENT (OUTPATIENT)
Dept: GENERAL RADIOLOGY | Age: 42
End: 2022-05-25
Payer: MEDICARE

## 2022-05-25 VITALS
OXYGEN SATURATION: 99 % | DIASTOLIC BLOOD PRESSURE: 102 MMHG | RESPIRATION RATE: 20 BRPM | SYSTOLIC BLOOD PRESSURE: 141 MMHG | TEMPERATURE: 98.1 F | HEIGHT: 62 IN | WEIGHT: 125 LBS | BODY MASS INDEX: 23 KG/M2 | HEART RATE: 105 BPM

## 2022-05-25 DIAGNOSIS — S22.31XA CLOSED FRACTURE OF ONE RIB OF RIGHT SIDE, INITIAL ENCOUNTER: Primary | ICD-10-CM

## 2022-05-25 PROCEDURE — 6370000000 HC RX 637 (ALT 250 FOR IP): Performed by: NURSE PRACTITIONER

## 2022-05-25 PROCEDURE — 6360000002 HC RX W HCPCS: Performed by: NURSE PRACTITIONER

## 2022-05-25 PROCEDURE — 99284 EMERGENCY DEPT VISIT MOD MDM: CPT

## 2022-05-25 PROCEDURE — 96372 THER/PROPH/DIAG INJ SC/IM: CPT

## 2022-05-25 PROCEDURE — 71101 X-RAY EXAM UNILAT RIBS/CHEST: CPT

## 2022-05-25 RX ORDER — ORPHENADRINE CITRATE 30 MG/ML
60 INJECTION INTRAMUSCULAR; INTRAVENOUS ONCE
Status: COMPLETED | OUTPATIENT
Start: 2022-05-25 | End: 2022-05-25

## 2022-05-25 RX ORDER — SUMATRIPTAN 100 MG/1
TABLET, FILM COATED ORAL
COMMUNITY
Start: 2022-02-18

## 2022-05-25 RX ORDER — CYCLOBENZAPRINE HCL 5 MG
5 TABLET ORAL 2 TIMES DAILY PRN
Qty: 10 TABLET | Refills: 0 | Status: SHIPPED | OUTPATIENT
Start: 2022-05-25 | End: 2022-05-30

## 2022-05-25 RX ORDER — ERGOCALCIFEROL 1.25 MG/1
CAPSULE ORAL
COMMUNITY
Start: 2022-03-12

## 2022-05-25 RX ORDER — IBUPROFEN 800 MG/1
800 TABLET ORAL ONCE
Status: COMPLETED | OUTPATIENT
Start: 2022-05-25 | End: 2022-05-25

## 2022-05-25 RX ORDER — HYDROCODONE BITARTRATE AND ACETAMINOPHEN 5; 325 MG/1; MG/1
1 TABLET ORAL EVERY 8 HOURS PRN
Qty: 9 TABLET | Refills: 0 | Status: SHIPPED | OUTPATIENT
Start: 2022-05-25 | End: 2022-05-28

## 2022-05-25 RX ORDER — MELOXICAM 15 MG/1
TABLET ORAL
COMMUNITY
Start: 2022-03-14

## 2022-05-25 RX ORDER — TERBINAFINE HYDROCHLORIDE 250 MG/1
TABLET ORAL
COMMUNITY
Start: 2022-02-18

## 2022-05-25 RX ORDER — AMITRIPTYLINE HYDROCHLORIDE 25 MG/1
TABLET, FILM COATED ORAL
COMMUNITY
Start: 2022-02-18

## 2022-05-25 RX ADMIN — IBUPROFEN 800 MG: 800 TABLET, FILM COATED ORAL at 20:29

## 2022-05-25 RX ADMIN — ORPHENADRINE CITRATE 60 MG: 30 INJECTION INTRAMUSCULAR; INTRAVENOUS at 20:29

## 2022-05-25 ASSESSMENT — ENCOUNTER SYMPTOMS
GASTROINTESTINAL NEGATIVE: 1
EYES NEGATIVE: 1
RESPIRATORY NEGATIVE: 1

## 2022-05-25 ASSESSMENT — PAIN DESCRIPTION - ORIENTATION: ORIENTATION: RIGHT

## 2022-05-25 ASSESSMENT — PAIN SCALES - GENERAL
PAINLEVEL_OUTOF10: 10
PAINLEVEL_OUTOF10: 10

## 2022-05-25 ASSESSMENT — PAIN DESCRIPTION - DESCRIPTORS: DESCRIPTORS: SPASM

## 2022-05-25 ASSESSMENT — PAIN DESCRIPTION - LOCATION: LOCATION: RIB CAGE

## 2022-05-26 NOTE — ED PROVIDER NOTES
[facility] ED  [address] [facility] [address] [place] [address]  Phone: [phone]  Fax: [phone]        Pt Name: Tammy Kirkland  MRN: [id number]  [place] 1980  Date of evaluation: 22    [address] [place] [address]       Chief Complaint   Patient presents with    Rib Pain       HISTORY OF PRESENT ILLNESS (Location/Symptom, Timing/Onset, Context/Setting, Quality, Duration, Modifying Factors, Severity)      Tammy Kirkland is a 38 y.o. female with no pertinent PMH who presents to the ED via private auto with complaints of a fall out of her bed on . Patient states she fell onto her right side onto the carpet. She states that ever since has been having pain in her right ribs. She states the pain is worse with deep breaths. She denies any chest pain, states she is not short of breath. She denies any headache dizziness or lightheadedness. She states when she fell she did not hit her head or lose consciousness. She denies any numbness or tingling in her extremities. She denies any gait changes. She states has been taking ibuprofen and Tylenol for pain control with some relief. She states she also has Lidoderm patches at home which she uses with some relief of symptoms as well. PAST MEDICAL / SURGICAL / SOCIAL / FAMILY HISTORY     PMH:  has a past medical history of Anxiety and Anxiety. Surgical History:  has a past surgical history that includes  section; Tubal ligation; Cholecystectomy, laparoscopic (2021); and Cholecystectomy, laparoscopic (N/A, 2021). Social History:  reports that she quit smoking about 2 years ago. Her smoking use included cigarettes. She has a 5.00 pack-year smoking history. She has never used smokeless tobacco. She reports current alcohol use. She reports that she does not use drugs. Family History: has no family status information on file. family history is not on file.   Psychiatric History: None    Allergies: Patient has no known allergies. Home Medications:   Prior to Admission medications    Medication Sig Start Date End Date Taking? Authorizing Provider   cyclobenzaprine (FLEXERIL) 5 MG tablet Take 1 tablet by mouth 2 times daily as needed for Muscle spasms 5/25/22 5/30/22 Yes LUKE Little NP   HYDROcodone-acetaminophen (NORCO) 5-325 MG per tablet Take 1 tablet by mouth every 8 hours as needed for Pain for up to 3 days. Intended supply: 3 days. Take lowest dose possible to manage pain 5/25/22 5/28/22 Yes LUKE Little NP   amitriptyline (ELAVIL) 25 mg tablet TAKE 1 TABLET BY MOUTH EVERY DAY AT NIGHT 2/18/22   Historical Provider, MD   vitamin D (ERGOCALCIFEROL) 1.25 MG (60200 UT) CAPS capsule  3/12/22   Historical Provider, MD   meloxicam (MOBIC) 15 MG tablet  3/14/22   Historical Provider, MD   SUMAtriptan (IMITREX) 100 MG tablet  2/18/22   Historical Provider, MD   terbinafine (LAMISIL) 250 MG tablet TAKE 1 TABLET BY MOUTH EVERY DAY 2/18/22   Historical Provider, MD   pantoprazole (PROTONIX) 40 MG tablet Take 1 tablet by mouth every morning (before breakfast) 3/2/22   Courtney Wiggins MD   ondansetron (ZOFRAN-ODT) 4 MG disintegrating tablet Take 1 tablet by mouth 3 times daily as needed for Nausea or Vomiting 1/14/22   Mikaela Collins, DO   Elastic Bandages & Supports (ABDOMINAL BINDER/ELASTIC MED) MISC 1 Units by Does not apply route as needed (post op pain) 12/29/21   Guevara Schaffer, DO   Elastic Bandages & Supports (ABDOMINAL BINDER/ELASTIC MED) MISC 1 Units by Does not apply route as needed (post op pain) 12/29/21   Kiah Zuniga, DO   butalbital-acetaminophen-caffeine-codeine (FIORICET WITH CODEINE) -69-30 MG per capsule Take 1 capsule by mouth every 4 hours.  11/14/18   Historical Provider, MD   Cholecalciferol (VITAMIN D3) 72949 units CAPS Take by mouth    Historical Provider, MD   diazepam (VALIUM) 5 MG tablet Take 5 mg by mouth every 6 hours as needed for Anxiety    Historical Provider, MD ParrRone (BUSPAR) 15 MG tablet Take 30 mg by mouth 2 times daily    Historical Provider, MD       REVIEW OF SYSTEMS  (2-9 systems for level 4, 10 ormore for level 5)      Review of Systems   Constitutional: Negative. HENT: Negative. Eyes: Negative. Respiratory: Negative. Cardiovascular: Negative. Gastrointestinal: Negative. Endocrine: Negative. Genitourinary: Negative. Musculoskeletal:        Right-sided rib pain   Skin: Negative. Neurological: Negative. Hematological: Negative. Psychiatric/Behavioral: Negative. All other systems negative except as marked. PHYSICAL EXAM  (up to 7 for level 4, 8 or more for level 5)      INITIAL VITALS:  height is 5' 2\" (1.575 m) and weight is 56.7 kg (125 lb). Her oral temperature is 98.1 °F (36.7 °C). Her blood pressure is 141/102 (abnormal) and her pulse is 105 (abnormal). Her respiration is 20 and oxygen saturation is 99%. Vital signs reviewed. Physical Exam  Constitutional:       Appearance: Normal appearance. HENT:      Head: Normocephalic. Right Ear: Tympanic membrane, ear canal and external ear normal.      Left Ear: Tympanic membrane, ear canal and external ear normal.      Nose: Nose normal.      Mouth/Throat:      Mouth: Mucous membranes are moist.      Pharynx: Oropharynx is clear. Eyes:      Extraocular Movements: Extraocular movements intact. Conjunctiva/sclera: Conjunctivae normal.      Pupils: Pupils are equal, round, and reactive to light. Cardiovascular:      Rate and Rhythm: Normal rate and regular rhythm. Pulses: Normal pulses. Heart sounds: Normal heart sounds. Pulmonary:      Effort: Pulmonary effort is normal.      Breath sounds: Normal breath sounds. Abdominal:      General: Abdomen is flat. Bowel sounds are normal. There is no distension. Palpations: Abdomen is soft. There is no mass. Tenderness: There is no abdominal tenderness. There is no guarding.    Musculoskeletal: General: Tenderness present. Cervical back: Normal range of motion. Comments: Tenderness over the eighth and ninth rib with deep breaths and was well palpation; no significant deformity noted; no swelling or bruising noted   Skin:     General: Skin is warm and dry. Capillary Refill: Capillary refill takes less than 2 seconds. Neurological:      Mental Status: She is alert and oriented to person, place, and time. Psychiatric:         Mood and Affect: Mood normal.         Behavior: Behavior normal.         Thought Content: Thought content normal.         Judgment: Judgment normal.           DIFFERENTIAL DIAGNOSIS / MDM     Upon exam, patient resting in her room no distress noted. She does appear to be in some discomfort though. Heart sound within normal limits upon auscultation. Lung sounds are clear and equal bilaterally. Bowel sounds are present in all 4 quadrants auscultation. Abdomen is flat nondistended soft there is no guarding or mass noted. Upper extremity strength equal bilaterally. Lower extremity strength equal bilaterally. Patient's gait is steady. Pupils are equal round reactive to light. I will give patient a dose of Norflex as well as some ibuprofen while she is here. I will also order a x-ray of the right ribs to include the chest.    X-rays negative for acute cardiopulmonary disease. Nondisplaced right ninth rib fracture noted. We will send patient home with a prescription for pain medication as well as for muscle relaxer. Patient is encouraged to follow-up with her primary care physician within the next day or so. I will send patient home with an incentive spirometer. Educated patient to return to emergency part with any new concerning worsening symptoms specially occluding any further injury, chest pain, shortness of breath, hemoptysis, inability to take a deep breath, fever, chills, body aches or worsening of overall symptoms.   Patient states understanding

## 2022-05-26 NOTE — ED PROVIDER NOTES
[address] [facility] ED  [address] \[facility]\"" [address]  Phone: [phone]  Fax: [phone]      Attending Physician Attestation    I performed a history and physical examination of the patient and discussed management with the mid level provider. I reviewed the mid level provider's note and agree with the documented findings and plan of care. Any areas of disagreement are noted on the chart. I was personally present for the key portions of any procedures. I have documented in the chart those procedures where I was not present during the key portions. I have reviewed the emergency nurses triage note. I agree with the chief complaint, past medical history, past surgical history, allergies, medications, social and family history as documented unless otherwise noted below. Documentation of the HPI, Physical Exam and Medical Decision Making performed by mid level providers is based on my personal performance of the HPI, PE and MDM. For Physician Assistant/ Nurse Practitioner cases/documentation I have personally evaluated this patient and have completed at least one if not all key elements of the E/M (history, physical exam, and MDM). Additional findings are as noted. CHIEF COMPLAINT       Chief Complaint   Patient presents with    Rib Pain         HISTORY OF PRESENT ILLNESS    Rickey Perez is a 38 y.o. female who presents for evaluation of rib pain on the right side. The patient reports that 4 days ago she accidentally fell out of bed and rolled onto her right side onto the floor. She denies striking her head or loss of consciousness. She states that since that time she has had a sharp, stabbing, constant, nonradiating pain to her right ribs with associated bruising. She states that she has felt cracking and popping of the right ribs with certain movements. She has also had intermittent muscle spasm to her right side back.   She has been applying Lidoderm patches and taking Tylenol with some improvement. The patient denies fever, chills, headache, vision changes, neck pain, chest pain, shortness of breath, abdominal pain, urinary/bowel symptoms, nausea, vomiting, focal weakness, numbness, tingling, recent illness. PAST MEDICAL HISTORY    has a past medical history of Anxiety and Anxiety. SURGICAL HISTORY      has a past surgical history that includes  section; Tubal ligation; Cholecystectomy, laparoscopic (2021); and Cholecystectomy, laparoscopic (N/A, 2021). CURRENT MEDICATIONS       Discharge Medication List as of 2022  9:19 PM      CONTINUE these medications which have NOT CHANGED    Details   amitriptyline (ELAVIL) 25 mg tablet TAKE 1 TABLET BY MOUTH EVERY DAY AT NIGHTHistorical Med      vitamin D (ERGOCALCIFEROL) 1.25 MG (59742 UT) CAPS capsule Historical Med      meloxicam (MOBIC) 15 MG tablet Historical Med      SUMAtriptan (IMITREX) 100 MG tablet Historical Med      terbinafine (LAMISIL) 250 MG tablet TAKE 1 TABLET BY MOUTH EVERY DAYHistorical Med      pantoprazole (PROTONIX) 40 MG tablet Take 1 tablet by mouth every morning (before breakfast), Disp-30 tablet, R-2Normal      ondansetron (ZOFRAN-ODT) 4 MG disintegrating tablet Take 1 tablet by mouth 3 times daily as needed for Nausea or Vomiting, Disp-21 tablet, R-0Print      !! Elastic Bandages & Supports (ABDOMINAL BINDER/ELASTIC MED) [facility] PRN Starting 2021, Disp-1 each, R-0, Normal      !! Elastic Bandages & Supports (ABDOMINAL BINDER/ELASTIC MED) [facility] PRN Starting 2021, Disp-1 each, R-0, Print      butalbital-acetaminophen-caffeine-codeine (FIORICET WITH CODEINE) -42-30 MG per capsule Take 1 capsule by mouth every 4 hours. Historical Med      Cholecalciferol (VITAMIN D3) 93543 units CAPS Take by mouthHistorical Med      diazepam (VALIUM) 5 MG tablet Take 5 mg by mouth every 6 hours as needed for Anxiety      busPIRone (BUSPAR) 15 MG tablet Take 30 mg by mouth 2 times daily       ! ! - Potential duplicate medications found. Please discuss with provider. ALLERGIES     has No Known Allergies. FAMILY HISTORY     has no family status information on file. family history is not on file. SOCIAL HISTORY      reports that she quit smoking about 2 years ago. Her smoking use included cigarettes. She has a 5.00 pack-year smoking history. She has never used smokeless tobacco. She reports current alcohol use. She reports that she does not use drugs. PHYSICAL EXAM     INITIAL VITALS:  height is 5' 2\" (1.575 m) and weight is 56.7 kg (125 lb). Her oral temperature is 98.1 °F (36.7 °C). Her blood pressure is 141/102 (abnormal) and her pulse is 105 (abnormal). Her respiration is 20 and oxygen saturation is 99%. Heart regular rate and rhythm. Lungs clear to auscultation. Abdomen soft nontender. She has tenderness to palpation over the right lateral chest wall with some associated ecchymosis. No crepitus. DIAGNOSTIC RESULTS     EKG: All EKG's are interpreted by the Emergency Department Physician who either signs or Co-signs this chart in the absence of a cardiologist.    None    RADIOLOGY:     XR RIBS RIGHT INCLUDE CHEST (MIN 3 VIEWS)    Result Date: 5/25/2022  EXAMINATION: 2 XRAY VIEWS OF THE RIGHT RIBS WITH FRONTAL XRAY VIEW OF THE CHEST 5/25/2022 8:15 pm COMPARISON: 09/22/2015 HISTORY: ORDERING SYSTEM PROVIDED HISTORY: fall, rib pain TECHNOLOGIST PROVIDED HISTORY: fall, rib pain Reason for Exam: Right side anterior and posterior rib pain post fall out of bed FINDINGS: Cardiomediastinal silhouette within normal limits. Lungs and costophrenic sulci are clear. No pneumothorax or subdiaphragmatic free air. Nondisplaced right 9th rib fracture. No radiographic evidence of acute cardiopulmonary disease. Nondisplaced right 9th rib fracture. LABS:  No results found for this visit on 05/25/22.     None    EMERGENCY DEPARTMENT COURSE:   Vitals:    Vitals: 05/25/22 1946 05/25/22 1947   BP: (!) 141/102    Pulse: (!) 105    Resp: 20    Temp: 98.1 °F (36.7 °C)    TempSrc: Oral    SpO2: 99%    Weight:  56.7 kg (125 lb)   Height:  5' 2\" (1.575 m)     -------------------------  BP: (!) 141/102, Temp: 98.1 °F (36.7 °C), Heart Rate: (!) 105, Resp: 20      PERTINENT ATTENDING PHYSICIAN COMMENTS:    The patient is a 29-year-old female who presents for evaluation of right-sided rib pain. Vital signs are stable except for slight tachycardia which is suspected secondary to pain. She has tenderness to palpation over the right lateral ribs. She was treated with ibuprofen and Norflex with improvement in pain. She was also given incentive spirometer. X-ray of the right ribs shows a nondisplaced right ninth rib fracture without any evidence of acute cardiopulmonary disease. I suspect the patient's pain is secondary to her rib fracture. I have low suspicion for any other injury or illness. I instructed the patient to continue to use Lidoderm patches, ibuprofen and Tylenol as needed for pain. She will be discharged home with prescriptions for Flexeril and Norco to help manage her symptoms. She was instructed to continue to use the incentive spirometer as instructed and to follow-up with her PCP within 1 to 2 days. She was instructed to return to the ER for worsening symptoms or any other concern. The patient understands that at this time there is no evidence for a more malignant underlying process, but also understands that early in the process of an illness or injury, an emergency department work-up can be falsely reassuring. Routine discharge counseling was given, and the patient understands that worsening, changing or persistent symptoms should prompt a immediate call or follow-up with their primary care physician or return to the emergency department. The importance of appropriate follow-up was also discussed. I have reviewed the disposition diagnosis with the patient. I have answered their questions and given discharge instructions. They voiced understanding of these instructions and did not have any further questions or complaints. Diagnosis:  1.  Closed fracture of one rib of right side, initial encounter              (Please note that portions of this note were completed with a voice recognition program.  Efforts were made to edit the dictations but occasionally words are mis-transcribed.)    Duyen Koch DO, [facility]  Attending Emergency Medicine Physician       Duyen Koch DO  05/26/22 9539

## 2022-06-07 RX ORDER — PANTOPRAZOLE SODIUM 40 MG/1
TABLET, DELAYED RELEASE ORAL
Qty: 30 TABLET | Refills: 0 | Status: SHIPPED | OUTPATIENT
Start: 2022-06-07 | End: 2022-06-08

## 2022-06-08 RX ORDER — PANTOPRAZOLE SODIUM 40 MG/1
TABLET, DELAYED RELEASE ORAL
Qty: 30 TABLET | Refills: 2 | Status: SHIPPED | OUTPATIENT
Start: 2022-06-08 | End: 2022-10-14

## 2022-06-21 ENCOUNTER — HOSPITAL ENCOUNTER (EMERGENCY)
Age: 42
Discharge: HOME | End: 2022-06-22
Attending: EMERGENCY MEDICINE
Payer: MEDICARE

## 2022-06-21 DIAGNOSIS — G43.909 MIGRAINE WITHOUT STATUS MIGRAINOSUS, NOT INTRACTABLE, UNSPECIFIED MIGRAINE TYPE: Primary | ICD-10-CM

## 2022-06-21 PROCEDURE — 99284 EMERGENCY DEPT VISIT MOD MDM: CPT

## 2022-06-21 PROCEDURE — 96374 THER/PROPH/DIAG INJ IV PUSH: CPT

## 2022-06-21 PROCEDURE — 96375 TX/PRO/DX INJ NEW DRUG ADDON: CPT

## 2022-06-21 RX ORDER — PROCHLORPERAZINE EDISYLATE 5 MG/ML
10 INJECTION INTRAMUSCULAR; INTRAVENOUS ONCE
Status: COMPLETED | OUTPATIENT
Start: 2022-06-22 | End: 2022-06-22

## 2022-06-21 RX ORDER — KETOROLAC TROMETHAMINE 30 MG/ML
30 INJECTION, SOLUTION INTRAMUSCULAR; INTRAVENOUS ONCE
Status: COMPLETED | OUTPATIENT
Start: 2022-06-22 | End: 2022-06-22

## 2022-06-21 RX ORDER — DIPHENHYDRAMINE HYDROCHLORIDE 50 MG/ML
50 INJECTION INTRAMUSCULAR; INTRAVENOUS ONCE
Status: COMPLETED | OUTPATIENT
Start: 2022-06-22 | End: 2022-06-22

## 2022-06-21 RX ORDER — DEXAMETHASONE SODIUM PHOSPHATE 10 MG/ML
10 INJECTION INTRAMUSCULAR; INTRAVENOUS ONCE
Status: COMPLETED | OUTPATIENT
Start: 2022-06-22 | End: 2022-06-22

## 2022-06-21 RX ORDER — 0.9 % SODIUM CHLORIDE 0.9 %
1000 INTRAVENOUS SOLUTION INTRAVENOUS ONCE
Status: COMPLETED | OUTPATIENT
Start: 2022-06-22 | End: 2022-06-22

## 2022-06-21 ASSESSMENT — PAIN DESCRIPTION - DESCRIPTORS: DESCRIPTORS: ACHING

## 2022-06-21 ASSESSMENT — PAIN SCALES - GENERAL: PAINLEVEL_OUTOF10: 10

## 2022-06-21 ASSESSMENT — PAIN - FUNCTIONAL ASSESSMENT: PAIN_FUNCTIONAL_ASSESSMENT: 0-10

## 2022-06-22 VITALS
HEART RATE: 90 BPM | OXYGEN SATURATION: 98 % | SYSTOLIC BLOOD PRESSURE: 180 MMHG | WEIGHT: 120 LBS | TEMPERATURE: 98.8 F | BODY MASS INDEX: 22.08 KG/M2 | DIASTOLIC BLOOD PRESSURE: 117 MMHG | RESPIRATION RATE: 18 BRPM | HEIGHT: 62 IN

## 2022-06-22 PROCEDURE — 2580000003 HC RX 258: Performed by: EMERGENCY MEDICINE

## 2022-06-22 PROCEDURE — 96375 TX/PRO/DX INJ NEW DRUG ADDON: CPT

## 2022-06-22 PROCEDURE — 6360000002 HC RX W HCPCS: Performed by: EMERGENCY MEDICINE

## 2022-06-22 PROCEDURE — 96374 THER/PROPH/DIAG INJ IV PUSH: CPT

## 2022-06-22 RX ADMIN — PROCHLORPERAZINE EDISYLATE 10 MG: 5 INJECTION INTRAMUSCULAR; INTRAVENOUS at 00:21

## 2022-06-22 RX ADMIN — DIPHENHYDRAMINE HYDROCHLORIDE 50 MG: 50 INJECTION, SOLUTION INTRAMUSCULAR; INTRAVENOUS at 00:22

## 2022-06-22 RX ADMIN — KETOROLAC TROMETHAMINE 30 MG: 30 INJECTION, SOLUTION INTRAMUSCULAR at 00:22

## 2022-06-22 RX ADMIN — SODIUM CHLORIDE 1000 ML: 9 INJECTION, SOLUTION INTRAVENOUS at 00:33

## 2022-06-22 RX ADMIN — DEXAMETHASONE SODIUM PHOSPHATE 10 MG: 10 INJECTION INTRAMUSCULAR; INTRAVENOUS at 00:22

## 2022-06-22 ASSESSMENT — PAIN SCALES - GENERAL: PAINLEVEL_OUTOF10: 2

## 2022-06-22 NOTE — ED PROVIDER NOTES
[address] [facility] ED  [address] [facility] [address]  Phone: [phone]  Fax: [phone]      Pt Name: Joanie Johnson  KJ:[id number]  [place] 1980  Date of evaluation: 2022      CHIEF COMPLAINT       Chief Complaint   Patient presents with    Migraine     Imitrex at [place] [address]   Joanie Johnson is a 38 y.o. female history of migraines who presents for evaluation of a typical migraine. The patient reports that starting 2 hours ago she developed a gradual onset, constant, progressive, sharp, stabbing, right-sided headache that radiates behind her right eye. She complains of associated photophobia, phonophobia, nausea, and 5 episodes of nonbilious nonbloody emesis. The patient tried taking Imitrex without improvement. She does not list any other provoking or palliating factors. The patient states that this is a typical headache for her. She is currently in the process of establishing with a neurologist.  The patient denies fever, chills, vision changes, neck pain, back pain, chest pain, shortness of breath, abdominal pain, urinary/bowel symptoms, focal weakness, numbness, tingling, recent injury or illness. REVIEW OF SYSTEMS     Ten point review of systems was reviewed and is negative unless otherwise noted in the HPI    [address]    has a past medical history of Anxiety, Anxiety, and Migraines. SURGICAL HISTORY      has a past surgical history that includes  section; Tubal ligation; Cholecystectomy, laparoscopic (2021); and Cholecystectomy, laparoscopic (N/A, 2021).     CURRENT MEDICATIONS       Discharge Medication List as of 2022 12:42 AM      CONTINUE these medications which have NOT CHANGED    Details   pantoprazole (PROTONIX) 40 MG tablet TAKE 1 TABLET BY MOUTH IN THE MORNING BEFORE BREAKFAST, Disp-30 tablet, R-2Normal      amitriptyline (ELAVIL) 25 mg tablet TAKE 1 TABLET BY MOUTH EVERY DAY AT NIGHTHistorical Med      vitamin D (ERGOCALCIFEROL) 1.25 MG (65211 UT) CAPS capsule Historical Med      meloxicam (MOBIC) 15 MG tablet Historical Med      SUMAtriptan (IMITREX) 100 MG tablet Historical Med      terbinafine (LAMISIL) 250 MG tablet TAKE 1 TABLET BY MOUTH EVERY DAYHistorical Med      ondansetron (ZOFRAN-ODT) 4 MG disintegrating tablet Take 1 tablet by mouth 3 times daily as needed for Nausea or Vomiting, Disp-21 tablet, R-0Print      !! Elastic Bandages & Supports (ABDOMINAL BINDER/ELASTIC MED) [facility] Starting Wed 12/29/2021, Disp-1 each, R-0, Normal      !! Elastic Bandages & Supports (ABDOMINAL BINDER/ELASTIC MED) [facility] PRN Starting Wed 12/29/2021, Disp-1 each, R-0, Print      butalbital-acetaminophen-caffeine-codeine (FIORICET WITH CODEINE) -11-30 MG per capsule Take 1 capsule by mouth every 4 hours. Historical Med      Cholecalciferol (VITAMIN D3) 27701 units CAPS Take by mouthHistorical Med      diazepam (VALIUM) 5 MG tablet Take 5 mg by mouth every 6 hours as needed for Anxiety      busPIRone (BUSPAR) 15 MG tablet Take 30 mg by mouth 2 times daily       ! ! - Potential duplicate medications found. Please discuss with provider. ALLERGIES     has No Known Allergies. FAMILY HISTORY     has no family status information on file. family history is not on file. SOCIAL HISTORY      reports that she quit smoking about 3 years ago. Her smoking use included cigarettes. She has a 5.00 pack-year smoking history. She has never used smokeless tobacco. She reports current alcohol use. She reports that she does not use drugs. PHYSICAL EXAM     INITIAL VITALS:  height is 5' 2\" (1.575 m) and weight is 54.4 kg (120 lb). Her oral temperature is 98.8 °F (37.1 °C). Her blood pressure is 180/117 (abnormal) and her pulse is 90. Her respiration is 18 and oxygen saturation is 98%. CONSTITUTIONAL: Appears uncomfortable, photophobic.   SKIN: warm, dry, no jaundice, hives or petechiae  EYES: clear conjunctiva, non-icteric sclera, pupils 3 mm, equal, round reactive to light. Extraocular movements intact. HENT: normocephalic, atraumatic, moist mucus membranes  NECK: Nontender and supple with no nuchal rigidity, full range of motion  PULMONARY: clear to auscultation without wheezes, rhonchi, or rales, normal excursion, no accessory muscle use and no stridor  CARDIOVASCULAR: regular rate, rhythm. Strong radial pulses with intact distal perfusion. Capillary refill <2 seconds. GASTROINTESTINAL: soft, non-tender, non-distended, no palpable masses, no rebound or guarding   GENITOURINARY: No costovertebral angle tenderness to palpation  MUSCULOSKELETAL: No midline spinal tenderness, step off or deformity. Extremities are otherwise nontender to palpation and nonerythematous. Compartments soft. No peripheral edema. NEUROLOGIC: alert and oriented x 3, GCS 15, normal mentation and speech. Moves all extremities x 4 without motor or sensory deficit, gait is stable without ataxia. Cranial nerves II through XII intact. No cerebellar signs. No pronator drift. Normal finger-to-nose. PSYCHIATRIC: normal mood and affect, thought process is clear and linear    DIAGNOSTIC RESULTS     EKG:  None    RADIOLOGY:   None      LABS:  No results found for this visit on 06/21/22.     EMERGENCY DEPARTMENT COURSE:        The patient was given the following medications:  Orders Placed This Encounter   Medications    dexamethasone (DECADRON) injection 10 mg    prochlorperazine (COMPAZINE) injection 10 mg    ketorolac (TORADOL) injection 30 mg    diphenhydrAMINE (BENADRYL) injection 50 mg    0.9 % sodium chloride bolus        Vitals:    Vitals:    06/21/22 2321 06/22/22 0041   BP: (!) 180/117    Pulse: (!) 124 90   Resp: 18    Temp: 98.8 °F (37.1 °C)    TempSrc: Oral    SpO2: 98%    Weight: 54.4 kg (120 lb)    Height: 5' 2\" (1.575 m)      -------------------------  BP: (!) 180/117, Temp: 98.8 °F (37.1 °C), Heart Rate: 90, Resp: 18    CONSULTS:  None    CRITICAL CARE:   None    PROCEDURES:  None    DIAGNOSIS/ MDM:   Live Vital is a 38 y.o. female who presents with a typical migraine. Vital signs are stable. She is neurovascularly intact but does have significant photophobia. She was treated with Toradol, Benadryl, Decadron and Compazine with improvement in her symptoms. I suspect she is having a typical migraine. I have low suspicion for increased intracranial pressure, intracranial hemorrhage, or infection. The patient was instructed to follow-up with her PCP and neurologist within 1 to 2 days and to return to the ER for worsening symptoms or any other concern. The patient understands that at this time there is no evidence for a more malignant underlying process, but also understands that early in the process of an illness or injury, an emergency department work-up can be falsely reassuring. Routine discharge counseling was given, and the patient understands that worsening, changing or persistent symptoms should prompt a immediate call or follow-up with their primary care physician or return to the emergency department. The importance of appropriate follow-up was also discussed. I have reviewed the disposition diagnosis with the patient. I have answered their questions and given discharge instructions. They voiced understanding of these instructions and did not have any further questions or complaints. FINAL IMPRESSION      1. Migraine without status migrainosus, not intractable, unspecified migraine type          DISPOSITION/PLAN   DISPOSITION Decision To Discharge 06/22/2022 12:41:39 AM        PATIENT REFERRED TO:  Mohini Bird [address]  [id number]  [phone]    Schedule an appointment as soon as possible for a visit in 2 days      Your new neurologist    Go to       [facility] ED  [address]   [address] [place], [place] [id number]  [phone]  Go to   If symptoms worsen      DISCHARGE MEDICATIONS:  Discharge Medication List as of 6/22/2022 12:42 AM          (Please note that portions of this note were completed with a voice recognitionprogram.  Efforts were made to edit the dictations but occasionally words are mis-transcribed.)    Zana Gloria DO, [address] [facility]  Emergency Physician Attending         Zana Gloria DO  06/23/22 0696

## 2022-06-24 ENCOUNTER — HOSPITAL ENCOUNTER (EMERGENCY)
Age: 42
Discharge: HOME | End: 2022-06-25
Attending: SPECIALIST
Payer: MEDICARE

## 2022-06-24 DIAGNOSIS — K08.89 PAIN, DENTAL: Primary | ICD-10-CM

## 2022-06-24 DIAGNOSIS — K04.7 DENTAL INFECTION: ICD-10-CM

## 2022-06-24 PROCEDURE — 99284 EMERGENCY DEPT VISIT MOD MDM: CPT

## 2022-06-24 PROCEDURE — 96372 THER/PROPH/DIAG INJ SC/IM: CPT

## 2022-06-24 ASSESSMENT — PAIN SCALES - GENERAL: PAINLEVEL_OUTOF10: 10

## 2022-06-24 ASSESSMENT — LIFESTYLE VARIABLES: HOW MANY STANDARD DRINKS CONTAINING ALCOHOL DO YOU HAVE ON A TYPICAL DAY: 1 OR 2

## 2022-06-24 ASSESSMENT — PAIN DESCRIPTION - DESCRIPTORS: DESCRIPTORS: DISCOMFORT

## 2022-06-24 ASSESSMENT — PAIN DESCRIPTION - LOCATION: LOCATION: TEETH

## 2022-06-24 ASSESSMENT — PAIN DESCRIPTION - ORIENTATION: ORIENTATION: LEFT;LOWER;UPPER

## 2022-06-24 ASSESSMENT — PAIN - FUNCTIONAL ASSESSMENT: PAIN_FUNCTIONAL_ASSESSMENT: 0-10

## 2022-06-24 ASSESSMENT — PAIN DESCRIPTION - FREQUENCY: FREQUENCY: CONTINUOUS

## 2022-06-25 VITALS
BODY MASS INDEX: 22.08 KG/M2 | RESPIRATION RATE: 20 BRPM | OXYGEN SATURATION: 99 % | TEMPERATURE: 97.5 F | WEIGHT: 120 LBS | HEIGHT: 62 IN | DIASTOLIC BLOOD PRESSURE: 120 MMHG | HEART RATE: 100 BPM | SYSTOLIC BLOOD PRESSURE: 158 MMHG

## 2022-06-25 PROCEDURE — 6360000002 HC RX W HCPCS: Performed by: SPECIALIST

## 2022-06-25 PROCEDURE — 6370000000 HC RX 637 (ALT 250 FOR IP): Performed by: SPECIALIST

## 2022-06-25 RX ORDER — PENICILLIN V POTASSIUM 500 MG/1
500 TABLET ORAL 4 TIMES DAILY
Qty: 40 TABLET | Refills: 0 | Status: SHIPPED | OUTPATIENT
Start: 2022-06-25

## 2022-06-25 RX ORDER — KETOROLAC TROMETHAMINE 30 MG/ML
30 INJECTION, SOLUTION INTRAMUSCULAR; INTRAVENOUS ONCE
Status: COMPLETED | OUTPATIENT
Start: 2022-06-25 | End: 2022-06-25

## 2022-06-25 RX ORDER — PENICILLIN V POTASSIUM 250 MG/1
500 TABLET ORAL ONCE
Status: COMPLETED | OUTPATIENT
Start: 2022-06-25 | End: 2022-06-25

## 2022-06-25 RX ORDER — IBUPROFEN 600 MG/1
600 TABLET ORAL EVERY 6 HOURS PRN
Qty: 20 TABLET | Refills: 0 | Status: SHIPPED | OUTPATIENT
Start: 2022-06-25 | End: 2022-07-02

## 2022-06-25 RX ADMIN — KETOROLAC TROMETHAMINE 30 MG: 30 INJECTION, SOLUTION INTRAMUSCULAR at 00:38

## 2022-06-25 RX ADMIN — PENICILLIN V POTASSIUM 500 MG: 250 TABLET, FILM COATED ORAL at 00:38

## 2022-06-25 ASSESSMENT — PAIN SCALES - GENERAL: PAINLEVEL_OUTOF10: 10

## 2022-06-25 NOTE — ED PROVIDER NOTES
[address]      Pt Name: Nichole Salinas  MRN: [id number]  [place] 1980  Date of evaluation: 22      CHIEF COMPLAINT       Chief Complaint   Patient presents with    Dental Pain     left bottom and top tooth pain due to cracking during a migraine this week         HISTORY OF PRESENT ILLNESS    Nichole Salinas is a 38 y.o. female who presents to the emergency department accompanied by her  for evaluation of left upper and left lower jaw tooth ache since 2 days prior to arrival.  Patient has not noticed any discharge or drainage from the gum area and denies any sore throat, ear pain, fever or chills. She has taken Tylenol and applied teabags locally without any relief. Patient has had migraine headache 2 days ago and chipped her upper incisor tooth at that time but that pain is in the left upper and left lower jaw in the premolar and molar area. She denies any headache, visual disturbances, neck pain or stiffness. She grades pain as 10 out of 10 in intensity. REVIEW OF SYSTEMS       Review of Systems    All systems reviewed and negative unless noted in HPI. The patient denies fever or constitutional symptoms. Denies any sore throat or rhinorrhea. Complains of left upper and lower jaw tooth ache  Denies any neck pain or stiffness. Denies chest pain or shortness of breath. No nausea,  vomiting or diarrhea. Denies any dysuria. Denies urinary frequency or hematuria. Denies musculoskeletal injury or pain. Denies any weakness, numbness or focal neurologic deficit. Denies any skin rash or edema. No easy bruising or bleeding. Denies any polyuria, polydypsia       PAST MEDICAL HISTORY    has a past medical history of Anxiety, Anxiety, and Migraines. SURGICAL HISTORY      has a past surgical history that includes  section; Tubal ligation;  Cholecystectomy, laparoscopic (2021); and Cholecystectomy, laparoscopic (N/A, 12/13/2021). CURRENT MEDICATIONS       Discharge Medication List as of 6/25/2022 12:30 AM      CONTINUE these medications which have NOT CHANGED    Details   pantoprazole (PROTONIX) 40 MG tablet TAKE 1 TABLET BY MOUTH IN THE MORNING BEFORE BREAKFAST, Disp-30 tablet, R-2Normal      amitriptyline (ELAVIL) 25 mg tablet TAKE 1 TABLET BY MOUTH EVERY DAY AT NIGHTHistorical Med      vitamin D (ERGOCALCIFEROL) 1.25 MG (62017 UT) CAPS capsule Historical Med      meloxicam (MOBIC) 15 MG tablet Historical Med      SUMAtriptan (IMITREX) 100 MG tablet Historical Med      terbinafine (LAMISIL) 250 MG tablet TAKE 1 TABLET BY MOUTH EVERY DAYHistorical Med      ondansetron (ZOFRAN-ODT) 4 MG disintegrating tablet Take 1 tablet by mouth 3 times daily as needed for Nausea or Vomiting, Disp-21 tablet, R-0Print      !! Elastic Bandages & Supports (ABDOMINAL BINDER/ELASTIC MED) [facility] PRN Starting Wed 12/29/2021, Disp-1 each, R-0, Normal      !! Elastic Bandages & Supports (ABDOMINAL BINDER/ELASTIC MED) [facility] PRN Starting Wed 12/29/2021, Disp-1 each, R-0, Print      butalbital-acetaminophen-caffeine-codeine (FIORICET WITH CODEINE) -23-30 MG per capsule Take 1 capsule by mouth every 4 hours. Historical Med      Cholecalciferol (VITAMIN D3) 41942 units CAPS Take by mouthHistorical Med      diazepam (VALIUM) 5 MG tablet Take 5 mg by mouth every 6 hours as needed for Anxiety      busPIRone (BUSPAR) 15 MG tablet Take 30 mg by mouth 2 times daily       ! ! - Potential duplicate medications found. Please discuss with provider. ALLERGIES     has No Known Allergies. FAMILY HISTORY     has no family status information on file. family history is not on file. SOCIAL HISTORY      reports that she quit smoking about 3 years ago. Her smoking use included cigarettes. She has a 5.00 pack-year smoking history. She has never used smokeless tobacco. She reports current alcohol use. She reports that she does not use drugs.     PHYSICAL EXAM     INITIAL VITALS:  height is 5' 2\" (1.575 m) and weight is 54.4 kg (120 lb). Her axillary temperature is 97.5 °F (36.4 °C). Her blood pressure is 158/120 (abnormal) and her pulse is 100. Her respiration is 20 and oxygen saturation is 99%. Physical Exam  Vitals and nursing note reviewed. Constitutional:       Appearance: She is well-developed. HENT:      Head: Normocephalic and atraumatic. Jaw: No trismus. Nose: Nose normal.      Mouth/Throat:      Dentition: Dental tenderness present. No gingival swelling or dental abscesses. Comments: Patient has tenderness upon palpation of tooth #15, 16, 17 and 18. No evidence of fluctuance or abscess. There is no evidence of Ludewig's angina at all. Eyes:      Extraocular Movements: Extraocular movements intact. Pupils: Pupils are equal, round, and reactive to light. Cardiovascular:      Rate and Rhythm: Normal rate and regular rhythm. Heart sounds: Normal heart sounds. No murmur heard. Pulmonary:      Effort: Pulmonary effort is normal. No respiratory distress. Breath sounds: Normal breath sounds. Abdominal:      General: Bowel sounds are normal. There is no distension. Palpations: Abdomen is soft. Tenderness: There is no abdominal tenderness. Musculoskeletal:      Cervical back: Normal range of motion and neck supple. Skin:     General: Skin is warm and dry. Neurological:      General: No focal deficit present. Mental Status: She is alert and oriented to person, place, and time. DIFFERENTIAL DIAGNOSIS/ MDM:     Left upper and lower jaw tooth ache, possible dental infection with no evidence of abscess.     DIAGNOSTIC RESULTS     EKG: All EKG's are interpreted by the Emergency Department Physician who either signs or Co-signs this chart in the absence of a cardiologist.    None obtained    RADIOLOGY:   Interpretation per the Radiologist below, if available at the time of this note:    No results found. LABS:  No results found for this visit on 06/24/22. EMERGENCY DEPARTMENT COURSE:   Vitals:    Vitals:    06/24/22 2351   BP: (!) 158/120   Pulse: 100   Resp: 20   Temp: 97.5 °F (36.4 °C)   TempSrc: Axillary   SpO2: 99%   Weight: 54.4 kg (120 lb)   Height: 5' 2\" (1.575 m)     -------------------------  BP: (!) 158/120, Temp: 97.5 °F (36.4 °C), Heart Rate: 100, Resp: 20    Orders Placed This Encounter   Medications    penicillin v potassium (VEETID) tablet 500 mg     Order Specific Question:   Antimicrobial Indications     Answer: Other     Order Specific Question:   Other Abx Indication     Answer:   Toothache    ketorolac (TORADOL) injection 30 mg    penicillin v potassium (VEETID) 500 MG tablet     Sig: Take 1 tablet by mouth 4 times daily     Dispense:  40 tablet     Refill:  0    ibuprofen (IBU) 600 MG tablet     Sig: Take 1 tablet by mouth every 6 hours as needed for Pain     Dispense:  20 tablet     Refill:  0         During the emergency department course, patient was given Toradol 30 mg intramuscularly and Pen-Vee K 500 mg orally. Plan is to discharge the patient on Pen-Vee K for 10 days course, take Tylenol and ibuprofen as needed for the pain, follow-up with your dentist on Monday morning, call for appointment, return if worse. The patient and significant other understand that at this time there is no evidence for a more malignant underlying process, but also understands that early in the process of an illness or injury, an emergency department workup can be falsely reassuring. Routine discharge counseling was given, and it is understood that worsening, changing or persistent symptoms should prompt an immediate call or follow up with their primary physician or return to the emergency department. The importance of appropriate follow up was also discussed. I have reviewed the disposition diagnosis. I have answered the questions and given discharge instructions.   There was voiced understanding of these instructions and no further questions or complaints. CONSULTS:  None    PROCEDURES:  None    FINAL IMPRESSION      1. Pain, dental    2. Dental infection          DISPOSITION/PLAN       PATIENT REFERRED TO:  Your dentist    Call in 2 days  For reevaluation of current symptoms    [facility] ED  [address] [facility]. [address] [address]  [phone]    If symptoms worsen      DISCHARGE MEDICATIONS:  Discharge Medication List as of 6/25/2022 12:30 AM      START taking these medications    Details   penicillin v potassium (VEETID) 500 MG tablet Take 1 tablet by mouth 4 times daily, Disp-40 tablet, R-0Normal      ibuprofen (IBU) 600 MG tablet Take 1 tablet by mouth every 6 hours as needed for Pain, Disp-20 tablet, R-0Normal             (Please note that portions of this note were completed with a voice recognition program.  Efforts were made to edit the dictations but occasionally words are mis-transcribed.)    Librado Elmore MD,, MD, F.A.C.E.P.   Attending Emergency Medicine Physician     Librado Elmore MD  06/25/22 8705 no

## 2022-06-25 NOTE — ED TRIAGE NOTES
Patient arrived to ED room 8 with c/o dental pain she has been having the past few days. Patient states she had a migraine a few days ago and she chipped some of her teeth. Patient rates pain 10/10.

## 2022-06-26 ENCOUNTER — HOSPITAL ENCOUNTER (EMERGENCY)
Age: 42
Discharge: HOME | End: 2022-06-26
Attending: EMERGENCY MEDICINE
Payer: MEDICARE

## 2022-06-26 VITALS
OXYGEN SATURATION: 97 % | HEART RATE: 120 BPM | DIASTOLIC BLOOD PRESSURE: 77 MMHG | BODY MASS INDEX: 22.08 KG/M2 | TEMPERATURE: 97.9 F | WEIGHT: 120 LBS | SYSTOLIC BLOOD PRESSURE: 105 MMHG | HEIGHT: 62 IN | RESPIRATION RATE: 16 BRPM

## 2022-06-26 DIAGNOSIS — K08.89 PAIN, DENTAL: Primary | ICD-10-CM

## 2022-06-26 PROCEDURE — 99283 EMERGENCY DEPT VISIT LOW MDM: CPT

## 2022-06-26 PROCEDURE — 6370000000 HC RX 637 (ALT 250 FOR IP): Performed by: STUDENT IN AN ORGANIZED HEALTH CARE EDUCATION/TRAINING PROGRAM

## 2022-06-26 PROCEDURE — 64400 NJX AA&/STRD TRIGEMINAL NRV: CPT

## 2022-06-26 RX ADMIN — BENZOCAINE 1 EACH: 220 GEL, DENTIFRICE DENTAL at 18:27

## 2022-06-26 ASSESSMENT — PAIN - FUNCTIONAL ASSESSMENT: PAIN_FUNCTIONAL_ASSESSMENT: 0-10

## 2022-06-26 ASSESSMENT — PAIN SCALES - GENERAL: PAINLEVEL_OUTOF10: 10

## 2022-06-26 ASSESSMENT — ENCOUNTER SYMPTOMS
COUGH: 0
NAUSEA: 0
SHORTNESS OF BREATH: 0
ABDOMINAL PAIN: 0
VOMITING: 0

## 2022-06-26 ASSESSMENT — PAIN DESCRIPTION - ORIENTATION: ORIENTATION: LEFT

## 2022-06-26 ASSESSMENT — PAIN DESCRIPTION - LOCATION: LOCATION: JAW;HEAD

## 2022-06-26 NOTE — ED NOTES
Patient presents to ED with complaint of migraine and left sided jaw pain. Patient states that she went to [facility] 5 days ago for migraine. Patient states that pain was unrelieved causing her to clench her teeth/jaw. Patient states that she chipped her tooth. Patient states that she has been unable to sleep or eat due to jaw pain. Patient states she is concerned for infection. Patient stated that she took a 10mg Vicodin at 1500 for pain and it has not relieved pain. Patient is rating pain of 10/10. Call light in reach.       Srikanth Morelos RN  06/26/22 8739

## 2022-06-26 NOTE — ED PROVIDER NOTES
[address] [facility]     Emergency Department     Faculty Note/ Attestation      Pt Name: Cathryn Mejia                                       MRN: [id number]  [place] 1980  Date of evaluation: 6/26/2022    Patients PCP:    Demi Cervantes    Attestation  I performed a history and physical examination of the patient and discussed management with the resident. I reviewed the residents note and agree with the documented findings and plan of care. Any areas of disagreement are noted on the chart. I was personally present for the key portions of any procedures. I have documented in the chart those procedures where I was not present during the key portions. I have reviewed the emergency nurses triage note. I agree with the chief complaint, past medical history, past surgical history, allergies, medications, social and family history as documented unless otherwise noted below. For Physician Assistant/ Nurse Practitioner cases/documentation I have personally evaluated this patient and have completed at least one if not all key elements of the E/M (history, physical exam, and MDM). Additional findings are as noted. Initial Screens:        [place] Coma Scale  Eye Opening: Spontaneous  Best Verbal Response: Oriented  Best Motor Response: Obeys commands  [place] Coma Scale Score: 15    Vitals:    Vitals:    06/26/22 1755   BP: 105/77   Pulse: (!) 120   Resp: 18   Temp: 97.9 °F (36.6 °C)   TempSrc: Oral   SpO2: 97%   Weight: 120 lb (54.4 kg)   Height: 5' 2\" (1.575 m)       CHIEF COMPLAINT     No chief complaint on file. The pt has a migraine and clenched jaw that chipped her tooth. EMERGENCY DEPARTMENT COURSE:     -------------------------  BP: 105/77, Temp: 97.9 °F (36.6 °C), Heart Rate: (!) 120, Resp: 18  Physical Exam  Constitutional:       Appearance: She is well-developed. She is not diaphoretic. HENT:      Head: Normocephalic and atraumatic.       Right Ear: External ear normal. Left Ear: External ear normal.      Mouth/Throat:      Comments: The pt has a chipped tooth and pain along lower jaw as well. The pt has no fluctuance no signs of infection  Eyes:      General: No scleral icterus. Right eye: No discharge. Left eye: No discharge. Neck:      Trachea: No tracheal deviation. Pulmonary:      Effort: Pulmonary effort is normal. No respiratory distress. Breath sounds: No stridor. Musculoskeletal:         General: Normal range of motion. Cervical back: Normal range of motion. Skin:     General: Skin is warm and dry. Neurological:      Mental Status: She is alert and oriented to person, place, and time. Coordination: Coordination normal.   Psychiatric:         Behavior: Behavior normal.         Comments  Patient will be provided dental block will have follow-up tomorrow with dentist where she can get care and repair dental block with bupivacaine will hopefully last 8 to 12 hours and provide patient relief until she can be seen         Vic Dallas DO,, , RDMS.   Attending Emergency Physician          Vic Dallas DO  06/26/22 1755

## 2022-06-27 NOTE — ED PROVIDER NOTES
[facility] ED  Emergency Department Encounter  Emergency Medicine Resident     Pt Name: Jacqueline Diaz  MRN: [id number]  [place] 1980  Date of evaluation: 22  PCP:  Jason Jane    CHIEF COMPLAINT       Jaw pain      HISTORY OFPRESENT ILLNESS  (Location/Symptom, Timing/Onset, Context/Setting, Quality, Duration, Modifying Factors,Severity.)      Jacqueline Diaz is a 38 y.o. female with no pertinent past medical history who presents complaints of upper/lower left-sided jaw pain ongoing for several days. Patient states she initially had a migraine and noted during the migraine episode she was clenching and grinding her teeth. Patient states she was grinding her teeth so hard that she actually chipped one of her front teeth. Patient states tooth pain is primary located in her left lower jaw however her left upper jaw hurts as well. She currently rates the pain 10/10. She has been taking Tylenol and Motrin at home with no relief in her symptoms. Patient states no changes in her voice, no difficulty tolerating p.o., no respiratory difficulties or neck swelling. Patient reports she has made an appointment with dentist tomorrow at 9 AM but feels the pain so severe she cannot wait until her appointment. PAST MEDICAL / SURGICAL / SOCIAL / FAMILY HISTORY      has a past medical history of Anxiety, Anxiety, and Migraines. has a past surgical history that includes  section; Tubal ligation; Cholecystectomy, laparoscopic (2021); and Cholecystectomy, laparoscopic (N/A, 2021).     Social History     Socioeconomic History    Marital status: Single     Spouse name: Not on file    Number of children: Not on file    Years of education: Not on file    Highest education level: Not on file   Occupational History    Not on file   Tobacco Use    Smoking status: Former Smoker     Packs/day: 0.50     Years: 10.00     Pack years: 5.00     Types: Cigarettes     Quit date: 6/3/2019     Years since quitting: 3.0    Smokeless tobacco: Never Used   Vaping Use    Vaping Use: Never used   Substance and Sexual Activity    Alcohol use: Yes     Comment: social    Drug use: No    Sexual activity: Yes     Partners: Male   Other Topics Concern    Not on file   Social History Narrative    Not on file     Social Determinants of Health     Financial Resource Strain:     Difficulty of Paying Living Expenses: Not on file   Food Insecurity:     Worried About Running Out of Food in the Last Year: Not on file    Amena of Food in the Last Year: Not on file   Transportation Needs:     Lack of Transportation (Medical): Not on file    Lack of Transportation (Non-Medical): Not on file   Physical Activity:     Days of Exercise per Week: Not on file    Minutes of Exercise per Session: Not on file   Stress:     Feeling of Stress : Not on file   Social Connections:     Frequency of Communication with Friends and Family: Not on file    Frequency of Social Gatherings with Friends and Family: Not on file    Attends Gnosticism Services: Not on file    Active Member of [address] [place] [address] or Organizations: Not on file    Attends Club or Organization Meetings: Not on file    Marital Status: Not on file   Intimate Partner Violence:     Fear of Current or Ex-Partner: Not on file    Emotionally Abused: Not on file    Physically Abused: Not on file    Sexually Abused: Not on file   Housing Stability:     Unable to Pay for Housing in the Last Year: Not on file    Number of [place] in the Last Year: Not on file    Unstable Housing in the Last Year: Not on file       No family history on file. Allergies:  Patient has no known allergies. Home Medications:  Prior to Admission medications    Medication Sig Start Date End Date Taking?  Authorizing Provider   penicillin v potassium (VEETID) 500 MG tablet Take 1 tablet by mouth 4 times daily 6/25/22   Parish Peterson MD   ibuprofen (IBU) 600 MG tablet Take 1 tablet by mouth every 6 hours as needed for Pain 6/25/22 7/2/22  Alba Olmos MD   pantoprazole (PROTONIX) 40 MG tablet TAKE 1 TABLET BY MOUTH IN THE MORNING BEFORE BREAKFAST  Patient not taking: Reported on 6/24/2022 6/8/22   Jason Araya MD   amitriptyline (ELAVIL) 25 mg tablet TAKE 1 TABLET BY MOUTH EVERY DAY AT NIGHT 2/18/22   Historical Provider, MD   vitamin D (ERGOCALCIFEROL) 1.25 MG (65503 UT) CAPS capsule  3/12/22   Historical Provider, MD   meloxicam (MOBIC) 15 MG tablet  3/14/22   Historical Provider, MD   SUMAtriptan (IMITREX) 100 MG tablet  2/18/22   Historical Provider, MD   terbinafine (LAMISIL) 250 MG tablet TAKE 1 TABLET BY MOUTH EVERY DAY  Patient not taking: Reported on 6/24/2022 2/18/22   Historical Provider, MD   ondansetron (ZOFRAN-ODT) 4 MG disintegrating tablet Take 1 tablet by mouth 3 times daily as needed for Nausea or Vomiting 1/14/22   Charity Maldonado, DO   Elastic Bandages & Supports (ABDOMINAL BINDER/ELASTIC MED) MISC 1 Units by Does not apply route as needed (post op pain)  Patient not taking: Reported on 6/24/2022 12/29/21   Barbara Agudelo, DO   Elastic Bandages & Supports (ABDOMINAL BINDER/ELASTIC MED) MISC 1 Units by Does not apply route as needed (post op pain) 12/29/21   Kiah Zuniga, DO   butalbital-acetaminophen-caffeine-codeine (FIORICET WITH CODEINE) -14-30 MG per capsule Take 1 capsule by mouth every 4 hours. 11/14/18   Historical Provider, MD   Cholecalciferol (VITAMIN D3) 18222 units CAPS Take by mouth    Historical Provider, MD   diazepam (VALIUM) 5 MG tablet Take 5 mg by mouth every 6 hours as needed for Anxiety    Historical Provider, MD   busPIRone (BUSPAR) 15 MG tablet Take 30 mg by mouth 2 times daily    Historical Provider, MD       REVIEW OF SYSTEMS    (2-9 systems for level 4, 10 or more for level 5)      Review of Systems   Constitutional: Negative for fever. HENT: Positive for dental problem. Negative for congestion.          Positive for dental pain   Eyes: Negative for visual disturbance. Respiratory: Negative for cough and shortness of breath. Cardiovascular: Negative for chest pain. Gastrointestinal: Negative for abdominal pain, nausea and vomiting. Genitourinary: Negative for dysuria. Musculoskeletal: Negative for myalgias. Skin: Negative for rash. Neurological: Negative for headaches. PHYSICAL EXAM   (up to 7 for level 4, 8 or more for level 5)     INITIAL VITALS:    height is 5' 2\" (1.575 m) and weight is 120 lb (54.4 kg). Her oral temperature is 97.9 °F (36.6 °C). Her blood pressure is 105/77 and her pulse is 120 (abnormal). Her respiration is 16 and oxygen saturation is 97%. Physical Exam  Constitutional:       General: She is not in acute distress. Appearance: Normal appearance. She is not ill-appearing or toxic-appearing. HENT:      Mouth/Throat:      Comments: No intraoral swelling, no uvular deviation, chipped tooth noted central incisor on the superior aspect appears consistent with class I fracture. Neck:      Comments: No submandibular swelling  Cardiovascular:      Rate and Rhythm: Regular rhythm. Tachycardia present. Heart sounds: No murmur heard. Pulmonary:      Effort: Pulmonary effort is normal. No respiratory distress. Breath sounds: Normal breath sounds. No wheezing. Abdominal:      General: Abdomen is flat. Palpations: Abdomen is soft. Tenderness: There is no abdominal tenderness. There is no guarding. Musculoskeletal:      Cervical back: Normal range of motion. No rigidity or tenderness. Lymphadenopathy:      Cervical: No cervical adenopathy. Skin:     General: Skin is warm and dry. Findings: No rash. Neurological:      Mental Status: She is alert. DIFFERENTIAL  DIAGNOSIS     PLAN (LABS / IMAGING / EKG):  No orders of the defined types were placed in this encounter.       MEDICATIONS ORDERED:  Orders Placed This Encounter   Medications    benzocaine (LOLLICAINE) 20 % dental swab       DDX: Dental pain, dental caries    Initial MDM/Plan: 38 y.o. female who presents with dental pain ongoing for 2 days since grinding her teeth with a migraine. No red flag symptoms of change in voice, difficulty time p.o., difficulty with respiration, neck or submandibular swelling. Seen and examined, she does appear uncomfortable secondary to pain however not ill and nontoxic in appearance. Speaking full sentences. Vital signs demonstrate tachycardia 120 was likely secondary to pain, normotensive, afebrile and saturating well on room air. No submandibular swelling, uvular deviation, no peritonsillar swelling. No trismus. Class I fracture noted central incisor superior aspect. No intraoral abscess. Patient was offered dental block, accepted will perform dental block, discharge encourage dental appointment follow-up. DIAGNOSTIC RESULTS / EMERGENCY DEPARTMENT COURSE / MDM     LABS:  Labs Reviewed - No data to display      RADIOLOGY:  No results found. EMERGENCY DEPARTMENT COURSE:     Dental block performed, patient tolerated well, discharged. PROCEDURES:  Dental Nerve Block    Date/Time: 6/26/2022 11:44 PM  Performed by: Marciano Houser DO  Authorized by: Rhiannon Villatoro DO     Consent:     Consent obtained:  Verbal    Consent given by:  Patient    Risks discussed:  Intravascular injection, nerve damage, pain and unsuccessful block    Alternatives discussed:  No treatment  Indications:     Indications: dental pain    Location:     Block type: Inferior alveolar    Laterality:  Left  Procedure details (see MAR for exact dosages):     Needle gauge:  27 G    Anesthetic injected:  Bupivacaine 0.5% w/o epi    Injection procedure:  Anatomic landmarks identified, introduced needle and negative aspiration for blood  Post-procedure details:     Outcome:  Anesthesia achieved    Patient tolerance of procedure:   Tolerated well, no immediate complications        CONSULTS:  None    CRITICAL CARE:  Please see attending note    FINAL IMPRESSION      1.  Pain, dental          DISPOSITION / PLAN     DISPOSITION Decision To Discharge 06/26/2022 06:49:26 PM        PATIENTREFERRED TO:  Kishan Torres  [address] [facility]  [id number]  [facility] [address] [phone]    Schedule an appointment as soon as possible for a visit   As needed      DISCHARGE MEDICATIONS:  Discharge Medication List as of 6/26/2022  7:04 PM          Emeka Langston DO  EmergencyMedicine Resident    (Please note that portions of this note were completed with a voice recognition program.  Efforts were made to edit the dictations but occasionally words are mis-transcribed.)        Emeka Langston DO  Resident  06/26/22 1252

## 2022-06-29 ENCOUNTER — TELEPHONE (OUTPATIENT)
Dept: GASTROENTEROLOGY | Age: 42
End: 2022-06-29

## 2022-06-29 NOTE — TELEPHONE ENCOUNTER
Writer left detailed voicemail for patient, Provider order tests that need to be done prior to upcoming appointment.  Patient may have to reschedule appointment on 6/30/22

## 2022-06-30 ENCOUNTER — TELEPHONE (OUTPATIENT)
Dept: GASTROENTEROLOGY | Age: 42
End: 2022-06-30

## 2022-06-30 NOTE — TELEPHONE ENCOUNTER
Left VM inquiring about if labs were done for celiac disease which is the reason for today's appointment, asked to r/s if not done.

## 2022-10-14 RX ORDER — PANTOPRAZOLE SODIUM 40 MG/1
40 TABLET, DELAYED RELEASE ORAL
Qty: 90 TABLET | Refills: 1 | Status: SHIPPED | OUTPATIENT
Start: 2022-10-14

## 2022-10-14 RX ORDER — PANTOPRAZOLE SODIUM 40 MG/1
TABLET, DELAYED RELEASE ORAL
Qty: 30 TABLET | Refills: 0 | Status: SHIPPED | OUTPATIENT
Start: 2022-10-14

## 2024-06-10 NOTE — ED NOTES
Patient provided with discharge instructions, prescriptions, and follow up information. Verbalized understanding. No IV access to discontinue. A&OX3. Steady gait noted at discharge. Wheelchair declined by patient.       Merritt Lopes RN  03/19/22 8910
yes

## (undated) DEVICE — STERILE POLYISOPRENE POWDER-FREE SURGICAL GLOVES WITH EMOLLIENT COATING: Brand: PROTEXIS

## (undated) DEVICE — DEVICE TRCR 12X9X3IN WHT CLSR DISP OMNICLOSE

## (undated) DEVICE — MHPB BASIC LAP PACK: Brand: MEDLINE INDUSTRIES, INC.

## (undated) DEVICE — GLOVE SURG SZ 75 CRM LTX FREE POLYISOPRENE POLYMER BEAD ANTI

## (undated) DEVICE — SOLUTION IV IRRIG POUR BRL 0.9% SODIUM CHL 2F7124

## (undated) DEVICE — CANNULA SEAL

## (undated) DEVICE — TROCAR: Brand: KII FIOS FIRST ENTRY

## (undated) DEVICE — BLADELESS OBTURATOR: Brand: WECK VISTA

## (undated) DEVICE — STRAP,POSITIONING,KNEE/BODY,FOAM,4X60": Brand: MEDLINE

## (undated) DEVICE — CLIP INT L POLYMER LOK LIG HEM O LOK

## (undated) DEVICE — PENCIL ES L3M BTTN SWCH HOLSTER W/ BLDE ELECTRD EDGE

## (undated) DEVICE — 40580 - THE PINK PAD - ADVANCED TRENDELENBURG POSITIONING KIT: Brand: 40580 - THE PINK PAD - ADVANCED TRENDELENBURG POSITIONING KIT

## (undated) DEVICE — ARM DRAPE

## (undated) DEVICE — INSUFFLATION NEEDLE TO ESTABLISH PNEUMOPERITONEUM.: Brand: INSUFFLATION NEEDLE

## (undated) DEVICE — ADHESIVE SKIN CLSR 0.7ML TOP DERMBND ADV

## (undated) DEVICE — SUTURE MCRYL + SZ 4 0 L18IN ABSRB UD PC 3 L16MM 3 8 CIR PRIM MCP845G

## (undated) DEVICE — ELECTRODE PT RET AD L9FT HI MOIST COND ADH HYDRGEL CORDED

## (undated) DEVICE — BAG SPEC REM 224ML W4XL6IN DIA10MM 1 HND GYN DISP ENDOPCH

## (undated) DEVICE — SOLUTION ANTIFOG VIS SYS CLEARIFY LAPSCP

## (undated) DEVICE — GOWN,AURORA,NONRNF,XL,30/CS: Brand: MEDLINE

## (undated) DEVICE — SYRINGE MED 10ML SLIP TIP BLNT FILL AND LUERLOCK DISP

## (undated) DEVICE — BLANKET WRM W29.9XL79.1IN UP BODY FORC AIR MISTRAL-AIR

## (undated) DEVICE — SUTURE VCRL + SZ 0 L27IN ABSRB VLT L26MM UR-6 5/8 CIR VCP603H

## (undated) DEVICE — PROTECTOR ULN NRV PUR FOAM HK LOOP STRP ANATOMICALLY

## (undated) DEVICE — APPLICATOR MEDICATED 26 CC SOLUTION HI LT ORNG CHLORAPREP

## (undated) DEVICE — SHIELD SOAK PRE-KLENZ 6ML